# Patient Record
Sex: MALE | Race: WHITE | Employment: OTHER | URBAN - METROPOLITAN AREA
[De-identification: names, ages, dates, MRNs, and addresses within clinical notes are randomized per-mention and may not be internally consistent; named-entity substitution may affect disease eponyms.]

---

## 2021-09-30 ENCOUNTER — HOSPITAL ENCOUNTER (OUTPATIENT)
Dept: PHYSICAL THERAPY | Age: 76
Discharge: HOME | End: 2021-09-30
Payer: MEDICARE

## 2021-09-30 PROCEDURE — 97535 SELF CARE MNGMENT TRAINING: CPT

## 2021-09-30 PROCEDURE — 97166 OT EVAL MOD COMPLEX 45 MIN: CPT

## 2021-09-30 NOTE — THERAPY EVALUATION
Monica Chan  : 1945  [place]  Secondary: 310 [facility] at [address]  [address], [facility], [address]  [phone]   [phone]           OUTPATIENT OCCUPATIONAL THERAPY: Initial Assessment and Daily Note 2021    ICD-10: Treatment Diagnosis: I89.0 lymphedema not elsewhere classified, M79.89 swelling of both LE's. M79.609 pain in limbs  Precautions/Allergies:   Patient has no allergy information on record. Fall Risk Score:    Ambulatory/Rehab Services H2 Model Falls Risk Assessment    Risk Factors:       (1)  Gender [Male] Ability to Rise from Chair:       (1)  Pushes up, successful in one attempt    Falls Prevention Plan: Mobility Assistance Device (specify):  cane   Total: (5 or greater = High Risk): 2     [facility] [address] Patent #[id number]. Federal Law prohibits the replication, distribution or use without written permission from [facility] [employer]     MD Orders: eval and treat MEDICAL/REFERRING DIAGNOSIS:   Lymphedema, not elsewhere classified [I89.0]   DATE OF ONSET: chronic    REFERRING PHYSICIAN: Bernice Ramirez MD  RETURN PHYSICIAN APPOINTMENT: to be determined      INITIAL ASSESSMENT:  Mr. Anupam Delgado was referred to OT for the evaluation and treatment of bilateral LE lymphedema. Swelling has been present for >8 years. He had therapy for lymphedema including MLD and bandaging some years ago at [place] [address]. He responded well to therapy but did not continue to wear compression so LE redness and swelling returned and worsened over the years. He had RTHA in 2021 and is being seen for outpatient PT in [facility]. Swelling did not change following surgery. He reports legs are very heavy and interfere with standing, walking, perform ADL's. He has difficulty donning shoes and socks - has sock aid.  He is a recent  but adult grandson lives with him and helps as needed. Mr Marianela Hernández presents with bilateral LE lymphedema with thick/dense fluid, hyperpigmentation, lipodermatosclerosis, skin dryness, pain. He requires skilled OT for complete decongestive therapy to reduce LE swelling before transitioning to compression garments. He has already ordered compression velcro wraps from [facility] and they will be ready Oct 7th. PLAN OF CARE:   PROBLEM LIST:  1. Increased Pain  2. Decreased Activity Tolerance  3. Edema/Girth  4. Decreased Knowledge of Precautions  5. Decreased Skin Integrity/Hygeine INTERVENTIONS PLANNED  1. Skin care  2. Compression bandaging  3. Fitting for compression garment(s)  4. Manual therapy/Manual lymph drainage  5. Therapeutic exercise/Therapeutic activities  6. Patient Education  7. Compression pump trial prn  8.  kinesiotaping    TREATMENT PLAN:  Effective Dates: 9/30/21 to 12/29/2021 Frequency/Duration: 2x/week up to 90 days  2 xweek x90 days  and upon reassessment. Will adjust frequency and duration as progress indicates. GOALS: (Goals have been discussed and agreed upon with patient.)  Short-Term Functional Goals: Time Frame: 45 days  1. The patient/caregiver will verbalize understanding of lymphedema precautions. 2. Patient will be independent with skin care regimen to decrease risk of cellulitis. 3. The patient/caregiver will be independent with self-manual lymph drainage techniques and show decrease in limb volume. 4. Patient will be independent in lymphatic exercises. Discharge Goals: Time Frame: 90 days  1. Patient's bilateral lower extremity circumferential measurements will decrease on volumetric graph by 5-7cm to maximize functional use in ADL's.    2. The patient/caregiver will be independent with home management of lymphedema. 3. Patient/caregiver will be independent donning and doffing bilateral lower extremity compression garment.     Rehabilitation Potential For Stated Goals: Good  Regarding Jian Fair Jadyn's therapy, I certify that the treatment plan above will be carried out by a therapist or under their direction. Thank you for this referral,  Jhoana Luque, OTR/L, CLT    Referring Physician Signature: Geetha Hope MD _________________________  Date _________            The information in this section was collected on 9/30/2021   (except where otherwise noted). OCCUPATIONAL PROFILE & HISTORY:   History of Present Injury/Illness (Reason for Referral):  Mr. María Elena España was referred to OT for the evaluation and treatment of bilateral LE lymphedema. Swelling has been present for >8 years. He had therapy for lymphedema including MLD and bandaging some years ago at [facility]. He responded well to therapy but did not continue to wear compression so LE redness and swelling returned and worsened over the years. He had RTHA in July 2021 and is being seen for outpatient PT in [facility]. Swelling did not change following surgery. He reports legs are very heavy and interfere with standing, walking, perform ADL's. He has difficulty donning shoes and socks - has sock aid. He is a recent  but adult grandson lives with him and helps as needed. Mr María Elena España presents with bilateral LE lymphedema with thick/dense fluid, hyperpigmentation, lipodermatosclerosis, skin dryness, pain. He requires skilled OT for complete decongestive therapy to reduce LE swelling before transitioning to compression garments. He has already ordered compression velcro wraps from [facility] and they will be ready Oct 7th.    Past Medical History/Comorbidities:   Sleep apnea with CPAP, arthritis, cellulitis of LLE, catarcts, DM, GERD, hiatal hernia, HTN, Lymphedema, prostate cancer, varicella, venous insufficiency, RTHA 7/21  Social History/Living Environment:     pt lives in his own home with adult grandson -   Prior Level of Function/Work/Activity:  Works from home  - sits for extended periods of time at desk - very sedentary since [facility] in July Dominant Side:         RIGHT  Previous Treatment Approaches:          Therapy, compression   Current Medications:  No current outpatient medications on file. Pt will bring list with him to next appt           Date Last Reviewed:  9/30/2021   Complexity Level : Expanded review of therapy/medical records (1-2):  MODERATE COMPLEXITY   ASSESSMENT OF OCCUPATIONAL PERFORMANCE:   Palpation:          Bilateral LE lymphedema : thick, dense fluid from foot to knee   ROM:         PT s/p RTHA  Strength:          Overall decreased - generalized debility   Skin Integrity:          Small skin wound on left anterior tibial area that was weeping but now that it is healing weeping has stopped  - pt demonstrates significant skin changes with hyperpigmentation, skin dryness, lipodermatosclerosis     Sensation:  Intact light touch   Functional Mobility:  Cane   Activities of Daily Living : modified independence - has difficulty donning shoes/socks - has sock aid and is wearing slip on shoes   Edema/Girth:  2+ and pitting   PRETREATMENT AFFECTED LIMB(s): right lower extremity  left lower extremity      Date:  9.30.21         Right / Left           Groin   []      []           8 inches   []      []           4 inches   []      []         PoplitealSpace   [x]      [x] 47/46          8 inches   [x]      [x] 51/52          4 inches   [x]      [x] 41.5/41          Ankle   [x]      [x] 30.5/31          Instep   [x]      [x] 25/26        Measurements are taken in centimeters:  2.54 cm = 1 inch  Total:right 195cm        left 196cm              Physical Skills Involved:  1. Activity Tolerance  2. Edema  3. Skin Integrity  4. pain Cognitive Skills Affected (resulting in the inability to perform in a timely and safe manner):  1. Psychosocial Skills Affected:  1. Habits/Routines   Number of elements that affect the Plan of Care: 3-5:  MODERATE COMPLEXITY   CLINICAL DECISION MAKING:   Outcome Measure: Tool Used:  Tool Used: Lymphedema Life Impact Scale   Score:  Initial: 21 Most Recent: X (Date: -- )   Interpretation of Score: The Lymphedema Life Impact Scale (LLIS) is a validated instrument that measures the physical, functional, and psychosocial concerns pertinent to patients with extremity lymphedema. The Scale's questionnaire is administered to patients to gauge impairments, activity limitations, and participation restrictions resulting from their lymphedema. Score 0 1-13 14-26 27-40 41-54 55-67 68   Modifier CH CI CJ CK CL CM CN     ? Other PT/OT Primary Functional Limitations:     - CURRENT STATUS: CJ - 20%-39% impaired, limited or restricted    - GOAL STATUS: CI - 1%-19% impaired, limited or restricted    - D/C STATUS:  ---------------To be determined---------------       Medical Necessity:   · Skilled intervention continues to be required due to uncontrolled swelling increasing risk of cellulitis and hindering ADL's. Reason for Services/Other Comments:  · Patient continues to require skilled intervention due to inability to self manage lymphedema at this time. Clinical Decision-Making Assessment:     Use of outcome tool(s) and clinical judgement create a POC that gives a: Questionable prediction of patient's progress: MODERATE COMPLEXITY   TREATMENT:   (In addition to Assessment/Re-Assessment sessions the following treatments were rendered)    Pre-treatment Symptoms/Complaints:  Large legs with thick/dense fluid and significant skin changes   Pain: Initial:   Pain Intensity 1: 4  Post Session:  4   Occupational Therapy Treatments:    OT eval(x  ) OT eval was completed 9/30/21. Pt received information on lymphedema and risk reduction/self management practices as outlined by the National Lymphedema Network. Therapeutic Exercise ( minutes):     HEP:  As above; handouts given to patient for all exercises.   Manual Therapy:(minutes)          Manual Lymph Drainage:          Lymph Nodes:    Cervical Supraclavicular Axillary Abdominal Inguinal Popliteal Antecubital   RIGHT []     []     []     []     []     []     []       LEFT []     []     []     []     []     []     []          Anastamoses:   Axillo-axillary Inguino-inguinal Axillo-inguinal Inguino-axillary   ANTERIOR []     []     []     []       POSTERIOR []     []     []     []       RIGHT []     []     []     []       LEFT []     []     []     []         Limbs:   []    RUE     []    LUE     []    RLE    []    LLE   ADL/self care: (30 minutes): Pt was educated on lymph pathology, CDT, skin integrity management, precautions, Exercise/diet/elevation. Treatment was initiated with skin care and application of multi layered compression bandaging using 5\"biagrip stockinet, lymphesoft foam and 2 short stretch bandages per leg. Pt instructed to remove if any medical complications ie SOB, increased pain. Treatment/Session Assessment:    · Response to Treatment:  Pt's goal for therapy is to get swelling under control. He agrees with POC. · Compliance with Program/Exercises: Will assess as treatment progresses. · Recommendations/Intent for next treatment session: \"Next visit will focus on complete decongestive therapy - reduction phase\".   Total Treatment Duration:60min  OT Patient Time In/Time Out  Time In: 0900  Time Out: [address], OTR/L

## 2021-10-05 ENCOUNTER — HOSPITAL ENCOUNTER (OUTPATIENT)
Dept: PHYSICAL THERAPY | Age: 76
Discharge: HOME | End: 2021-10-05
Payer: MEDICARE

## 2021-10-05 PROCEDURE — 97140 MANUAL THERAPY 1/> REGIONS: CPT

## 2021-10-05 PROCEDURE — 97535 SELF CARE MNGMENT TRAINING: CPT

## 2021-10-05 NOTE — PROGRESS NOTES
Johanna Regan  : 1945  [place]  Secondary: 310 [facility] at [address]  [address], [address]  [phone]   [phone]           OUTPATIENT OCCUPATIONAL THERAPY: Daily Note 10/5/2021    ICD-10: Treatment Diagnosis: I89.0 lymphedema not elsewhere classified, M79.89 swelling of both LE's. M79.609 pain in limbs  Precautions/Allergies:   Patient has no allergy information on record. Fall Risk Score:    Ambulatory/Rehab Services H2 Model Falls Risk Assessment    Risk Factors:       (1)  Gender [Male] Ability to Rise from Chair:       (1)  Pushes up, successful in one attempt    Falls Prevention Plan: Mobility Assistance Device (specify):  cane   Total: (5 or greater = High Risk): 2     [facility] Patent #[id number]. Federal Law prohibits the replication, distribution or use without written permission from [facility] [employer]     MD Orders: eval and treat MEDICAL/REFERRING DIAGNOSIS:   Lymphedema, not elsewhere classified [I89.0]   DATE OF ONSET: chronic    REFERRING PHYSICIAN: Ricardo Linares MD  RETURN PHYSICIAN APPOINTMENT: to be determined      INITIAL ASSESSMENT:  Mr. Alan Leija was referred to OT for the evaluation and treatment of bilateral LE lymphedema. Swelling has been present for >8 years. He had therapy for lymphedema including MLD and bandaging some years ago at [employer]. He responded well to therapy but did not continue to wear compression so LE redness and swelling returned and worsened over the years. He had RTHA in 2021 and is being seen for outpatient PT in [facility]. Swelling did not change following surgery. He reports legs are very heavy and interfere with standing, walking, perform ADL's. He has difficulty donning shoes and socks - has sock aid.  He is a recent  but adult grandson lives with him and helps as needed. Mr Marianela Hernández presents with bilateral LE lymphedema with thick/dense fluid, hyperpigmentation, lipodermatosclerosis, skin dryness, pain. He requires skilled OT for complete decongestive therapy to reduce LE swelling before transitioning to compression garments. He has already ordered compression velcro wraps from [facility] and they will be ready Oct 7th. PLAN OF CARE:   PROBLEM LIST:  1. Increased Pain  2. Decreased Activity Tolerance  3. Edema/Girth  4. Decreased Knowledge of Precautions  5. Decreased Skin Integrity/Hygeine INTERVENTIONS PLANNED  1. Skin care  2. Compression bandaging  3. Fitting for compression garment(s)  4. Manual therapy/Manual lymph drainage  5. Therapeutic exercise/Therapeutic activities  6. Patient Education  7. Compression pump trial prn  8.  kinesiotaping    TREATMENT PLAN:  Effective Dates: 9/30/21 to 12/29/2021 Frequency/Duration: 2x/week up to 90 days  2 xweek x90 days  and upon reassessment. Will adjust frequency and duration as progress indicates. GOALS: (Goals have been discussed and agreed upon with patient.)  Short-Term Functional Goals: Time Frame: 45 days  1. The patient/caregiver will verbalize understanding of lymphedema precautions. 2. Patient will be independent with skin care regimen to decrease risk of cellulitis. 3. The patient/caregiver will be independent with self-manual lymph drainage techniques and show decrease in limb volume. 4. Patient will be independent in lymphatic exercises. Discharge Goals: Time Frame: 90 days  1. Patient's bilateral lower extremity circumferential measurements will decrease on volumetric graph by 5-7cm to maximize functional use in ADL's.    2. The patient/caregiver will be independent with home management of lymphedema. 3. Patient/caregiver will be independent donning and doffing bilateral lower extremity compression garment.     Rehabilitation Potential For Stated Goals: Good              The information in this section was collected on 10/5/2021   (except where otherwise noted). OCCUPATIONAL PROFILE & HISTORY:   History of Present Injury/Illness (Reason for Referral):  Mr. Aneudy Hicks was referred to OT for the evaluation and treatment of bilateral LE lymphedema. Swelling has been present for >8 years. He had therapy for lymphedema including MLD and bandaging some years ago at [email or web address]. He responded well to therapy but did not continue to wear compression so LE redness and swelling returned and worsened over the years. He had RTHA in July 2021 and is being seen for outpatient PT in [facility]. Swelling did not change following surgery. He reports legs are very heavy and interfere with standing, walking, perform ADL's. He has difficulty donning shoes and socks - has sock aid. He is a recent  but adult grandson lives with him and helps as needed. Mr Aneudy Hicks presents with bilateral LE lymphedema with thick/dense fluid, hyperpigmentation, lipodermatosclerosis, skin dryness, pain. He requires skilled OT for complete decongestive therapy to reduce LE swelling before transitioning to compression garments. He has already ordered compression velcro wraps from [facility] and they will be ready Oct 7th. Past Medical History/Comorbidities:   Sleep apnea with CPAP, arthritis, cellulitis of LLE, catarcts, DM, GERD, hiatal hernia, HTN, Lymphedema, prostate cancer, varicella, venous insufficiency, RTHA 7/21  Social History/Living Environment:     pt lives in his own home with adult grandson -   Prior Level of Function/Work/Activity:  Works from home  - sits for extended periods of time at desk - very sedentary since [address] in July   Dominant Side:         RIGHT  Previous Treatment Approaches:          Therapy, compression   Current Medications:  No current outpatient medications on file.  Pt will bring list with him to next appt           Date Last Reviewed:  10/5/2021   Complexity Level : Expanded review of therapy/medical records (1-2): MODERATE COMPLEXITY   ASSESSMENT OF OCCUPATIONAL PERFORMANCE:   Palpation:          Bilateral LE lymphedema : thick, dense fluid from foot to knee   ROM:         PT s/p RTHA  Strength:          Overall decreased - generalized debility   Skin Integrity:          Small skin wound on left anterior tibial area that was weeping but now that it is healing weeping has stopped  - pt demonstrates significant skin changes with hyperpigmentation, skin dryness, lipodermatosclerosis     Sensation:  Intact light touch   Functional Mobility:  Cane   Activities of Daily Living : modified independence - has difficulty donning shoes/socks - has sock aid and is wearing slip on shoes   Edema/Girth:  2+ and pitting   PRETREATMENT AFFECTED LIMB(s): right lower extremity  left lower extremity      Date:  9.30.21         Right / Left           Groin   []      []           8 inches   []      []           4 inches   []      []         PoplitealSpace   [x]      [x] 47/46          8 inches   [x]      [x] 51/52          4 inches   [x]      [x] 41.5/41          Ankle   [x]      [x] 30.5/31          Instep   [x]      [x] 25/26        Measurements are taken in centimeters:  2.54 cm = 1 inch  Total:right 195cm        left 196cm              Physical Skills Involved:  1. Activity Tolerance  2. Edema  3. Skin Integrity  4. pain Cognitive Skills Affected (resulting in the inability to perform in a timely and safe manner):  1. Psychosocial Skills Affected:  1. Habits/Routines   Number of elements that affect the Plan of Care: 3-5:  MODERATE COMPLEXITY   CLINICAL DECISION MAKING:   Outcome Measure: Tool Used: Tool Used: Lymphedema Life Impact Scale   Score:  Initial: 21 Most Recent: X (Date: -- )   Interpretation of Score: The Lymphedema Life Impact Scale (LLIS) is a validated instrument that measures the physical, functional, and psychosocial concerns pertinent to patients with extremity lymphedema.   The Scale's questionnaire is administered to patients to gauge impairments, activity limitations, and participation restrictions resulting from their lymphedema. Score 0 1-13 14-26 27-40 41-54 55-67 68   Modifier CH CI CJ CK CL CM CN     ? Other PT/OT Primary Functional Limitations:     - CURRENT STATUS: CJ - 20%-39% impaired, limited or restricted    - GOAL STATUS: CI - 1%-19% impaired, limited or restricted    - D/C STATUS:  ---------------To be determined---------------       Medical Necessity:   · Skilled intervention continues to be required due to uncontrolled swelling increasing risk of cellulitis and hindering ADL's. Reason for Services/Other Comments:  · Patient continues to require skilled intervention due to inability to self manage lymphedema at this time. Clinical Decision-Making Assessment:     Use of outcome tool(s) and clinical judgement create a POC that gives a: Questionable prediction of patient's progress: MODERATE COMPLEXITY   TREATMENT:   (In addition to Assessment/Re-Assessment sessions the following treatments were rendered)    Pre-treatment Symptoms/Complaints: Pt reports he wore bandages for 2 days, then, removed to take a shower. Pain: Initial:   Pain Intensity 1: 4  Post Session:  4   Occupational Therapy Treatments:    Therapeutic Exercise ( minutes):     HEP:  As above; handouts given to patient for all exercises. Manual Therapy:(45 minutes)  Manual lymphatic drainage B LEs and trunk.   Skin care with Eucerin          Manual Lymph Drainage:          Lymph Nodes:    Cervical Supraclavicular Axillary Abdominal Inguinal Popliteal Antecubital   RIGHT X     X    X     -     X     X     -       LEFT X     X     X     -     X    X     -          Anastamoses:   Axillo-axillary Inguino-inguinal Axillo-inguinal Inguino-axillary   ANTERIOR -     -     -     X       POSTERIOR -     -     -     -       RIGHT -     -     -     X       LEFT -     -     -     X         Limbs:   -    RUE     -    LUE     X    RLE    X LLE   ADL/self care: (15 minutes): Following skin care, application of multi layered compression bandaging using 4\" stockinet, grey foam and 3 short stretch bandages per leg. Pt instructed to remove if any medical complications ie SOB, increased pain. Treatment/Session Assessment:    · Response to Treatment:  Tolerated treatment without complication. · Compliance with Program/Exercises:  Compliant  · Recommendations/Intent for next treatment session: \"Next visit will focus on complete decongestive therapy - reduction phase\".   Total Treatment Duration:60min  OT Patient Time In/Time Out  Time In: 0800  Time Out: 0900    Ning Carl OTR/LIA

## 2021-10-07 ENCOUNTER — HOSPITAL ENCOUNTER (OUTPATIENT)
Dept: PHYSICAL THERAPY | Age: 76
Discharge: HOME | End: 2021-10-07
Payer: MEDICARE

## 2021-10-07 PROCEDURE — 97535 SELF CARE MNGMENT TRAINING: CPT

## 2021-10-07 PROCEDURE — 97140 MANUAL THERAPY 1/> REGIONS: CPT

## 2021-10-07 NOTE — PROGRESS NOTES
Aneudy Brown  : 1945  [place]  Secondary: [address] [facility] at [facility]  [address], [facility], [address]  [phone]   [phone]           OUTPATIENT OCCUPATIONAL THERAPY: Daily Note 10/7/2021    ICD-10: Treatment Diagnosis: I89.0 lymphedema not elsewhere classified, M79.89 swelling of both LE's. M79.609 pain in limbs  Precautions/Allergies:   Patient has no allergy information on record. Fall Risk Score:    Ambulatory/Rehab Services H2 Model Falls Risk Assessment    Risk Factors:       (1)  Gender [Male] Ability to Rise from Chair:       (1)  Pushes up, successful in one attempt    Falls Prevention Plan: Mobility Assistance Device (specify):  cane   Total: (5 or greater = High Risk): 2     [facility] [place] Patent #[id number]. Federal Law prohibits the replication, distribution or use without written permission from [facility] [employer]     MD Orders: eval and treat MEDICAL/REFERRING DIAGNOSIS:   Lymphedema, not elsewhere classified [I89.0]   DATE OF ONSET: chronic    REFERRING PHYSICIAN: Beto Sutton MD  RETURN PHYSICIAN APPOINTMENT: to be determined      INITIAL ASSESSMENT:  Mr. Daniel Sapp was referred to OT for the evaluation and treatment of bilateral LE lymphedema. Swelling has been present for >8 years. He had therapy for lymphedema including MLD and bandaging some years ago at [facility]. He responded well to therapy but did not continue to wear compression so LE redness and swelling returned and worsened over the years. He had RTHA in 2021 and is being seen for outpatient PT in [facility] [place]. Swelling did not change following surgery. He reports legs are very heavy and interfere with standing, walking, perform ADL's. He has difficulty donning shoes and socks - has sock aid.  He is a recent  but adult grandson lives with him and helps as needed. Mr Briseida Mayo presents with bilateral LE lymphedema with thick/dense fluid, hyperpigmentation, lipodermatosclerosis, skin dryness, pain. He requires skilled OT for complete decongestive therapy to reduce LE swelling before transitioning to compression garments. He has already [place] compression velcro [place] [facility] and they will be ready Oct 7th. PLAN OF CARE:   PROBLEM LIST:  1. Increased Pain  2. Decreased Activity Tolerance  3. Edema/Girth  4. Decreased Knowledge of Precautions  5. Decreased Skin Integrity/Hygeine INTERVENTIONS PLANNED  1. Skin care  2. Compression bandaging  3. Fitting for compression garment(s)  4. Manual therapy/Manual lymph drainage  5. Therapeutic exercise/Therapeutic activities  6. Patient Education  7. Compression pump trial prn  8.  kinesiotaping    TREATMENT PLAN:  Effective Dates: 9/30/21 to 12/29/2021 Frequency/Duration: 2x/week up to 90 days  2 xweek x90 days  and upon reassessment. Will adjust frequency and duration as progress indicates. GOALS: (Goals have been discussed and agreed upon with patient.)  Short-Term Functional Goals: Time Frame: 45 days  1. The patient/caregiver will verbalize understanding of lymphedema precautions. 2. Patient will be independent with skin care regimen to decrease risk of cellulitis. 3. The patient/caregiver will be independent with self-manual lymph drainage techniques and show decrease in limb volume. 4. Patient will be independent in lymphatic exercises. Discharge Goals: Time Frame: 90 days  1. Patient's bilateral lower extremity circumferential measurements will decrease on volumetric graph by 5-7cm to maximize functional use in ADL's.    2. The patient/caregiver will be independent with home management of lymphedema. 3. Patient/caregiver will be independent donning and doffing bilateral lower extremity compression garment.     Rehabilitation Potential For Stated Goals: Good              The information in this section was collected on 10/7/2021   (except where otherwise noted). OCCUPATIONAL PROFILE & HISTORY:   History of Present Injury/Illness (Reason for Referral):  Mr. Alan Leija was referred to OT for the evaluation and treatment of bilateral LE lymphedema. Swelling has been present for >8 years. He had therapy for lymphedema including MLD and bandaging some years ago at [facility]. He responded well to therapy but did not continue to wear compression so LE redness and swelling returned and worsened over the years. He had RTHA in July 2021 and is being seen for outpatient PT in [facility]. Swelling did not change following surgery. He reports legs are very heavy and interfere with standing, walking, perform ADL's. He has difficulty donning shoes and socks - has sock aid. He is a recent  but adult grandson lives with him and helps as needed. Mr Alan Leija presents with bilateral LE lymphedema with thick/dense fluid, hyperpigmentation, lipodermatosclerosis, skin dryness, pain. He requires skilled OT for complete decongestive therapy to reduce LE swelling before transitioning to compression garments. He has already ordered compression velcro wraps [facility] and they will be ready Oct 7th. Past Medical History/Comorbidities:   Sleep apnea with CPAP, arthritis, cellulitis of LLE, catarcts, DM, GERD, hiatal hernia, HTN, Lymphedema, prostate cancer, varicella, venous insufficiency, RTHA 7/21  Social History/Living Environment:     pt lives in his own home with adult grandson -   Prior Level of Function/Work/Activity:  Works from home  - sits for extended periods of time at desk - very sedentary since [address] in July   Dominant Side:         RIGHT  Previous Treatment Approaches:          Therapy, compression   Current Medications:  No current outpatient medications on file.  Pt will bring list with him to next appt           Date Last Reviewed:  10/7/2021   Complexity Level : Expanded review of therapy/medical records (1-2): MODERATE COMPLEXITY   ASSESSMENT OF OCCUPATIONAL PERFORMANCE:   Palpation:          Bilateral LE lymphedema : thick, dense fluid from foot to knee   ROM:         PT s/p RTHA  Strength:          Overall decreased - generalized debility   Skin Integrity:          Small skin wound on left anterior tibial area that was weeping but now that it is healing weeping has stopped  - pt demonstrates significant skin changes with hyperpigmentation, skin dryness, lipodermatosclerosis     Sensation:  Intact light touch   Functional Mobility:  Cane   Activities of Daily Living : modified independence - has difficulty donning shoes/socks - has sock aid and is wearing slip on shoes   Edema/Girth:  2+ and pitting   PRETREATMENT AFFECTED LIMB(s): right lower extremity  left lower extremity      Date:  9.30.21         Right / Left           Groin   []      []           8 inches   []      []           4 inches   []      []         PoplitealSpace   [x]      [x] 47/46          8 inches   [x]      [x] 51/52          4 inches   [x]      [x] 41.5/41          Ankle   [x]      [x] 30.5/31          Instep   [x]      [x] 25/26        Measurements are taken in centimeters:  2.54 cm = 1 inch  Total:right 195cm        left 196cm              Physical Skills Involved:  1. Activity Tolerance  2. Edema  3. Skin Integrity  4. pain Cognitive Skills Affected (resulting in the inability to perform in a timely and safe manner):  1. Psychosocial Skills Affected:  1. Habits/Routines   Number of elements that affect the Plan of Care: 3-5:  MODERATE COMPLEXITY   CLINICAL DECISION MAKING:   Outcome Measure: Tool Used: Tool Used: Lymphedema Life Impact Scale   Score:  Initial: 21 Most Recent: X (Date: -- )   Interpretation of Score: The Lymphedema Life Impact Scale (LLIS) is a validated instrument that measures the physical, functional, and psychosocial concerns pertinent to patients with extremity lymphedema.   The Scale's questionnaire is administered to patients to gauge impairments, activity limitations, and participation restrictions resulting from their lymphedema. Score 0 1-13 14-26 27-40 41-54 55-67 68   Modifier CH CI CJ CK CL CM CN     ? Other PT/OT Primary Functional Limitations:     - CURRENT STATUS: CJ - 20%-39% impaired, limited or restricted    - GOAL STATUS: CI - 1%-19% impaired, limited or restricted    - D/C STATUS:  ---------------To be determined---------------       Medical Necessity:   · Skilled intervention continues to be required due to uncontrolled swelling increasing risk of cellulitis and hindering ADL's. Reason for Services/Other Comments:  · Patient continues to require skilled intervention due to inability to self manage lymphedema at this time. Clinical Decision-Making Assessment:     Use of outcome tool(s) and clinical judgement create a POC that gives a: Questionable prediction of patient's progress: MODERATE COMPLEXITY   TREATMENT:   (In addition to Assessment/Re-Assessment sessions the following treatments were rendered)    Pre-treatment Symptoms/Complaints: Tolerating bandages well. Removed this morning to shower. Pain: Initial:   Pain Intensity 1: 4  Post Session:  4   Occupational Therapy Treatments:    Therapeutic Exercise ( minutes):     HEP:  As above; handouts given to patient for all exercises. Manual Therapy:(45 minutes)  Manual lymphatic drainage B LEs and trunk. Skin care with Eucerin followed by compression bandaging.            Manual Lymph Drainage:          Lymph Nodes:    Cervical Supraclavicular Axillary Abdominal Inguinal Popliteal Antecubital   RIGHT X     X    X     -     X     X     -       LEFT X     X     X     -     X    X     -          Anastamoses:   Axillo-axillary Inguino-inguinal Axillo-inguinal Inguino-axillary   ANTERIOR -     -     -     X       POSTERIOR -     -     -     -       RIGHT -     -     -     X       LEFT -     -     -     X         Limbs:   -    RUE     -    LUE X    RLE    X    LLE   ADL/self care: (15 minutes): Following skin care, application of multi layered compression bandaging using 4\" stockinet, grey foam and 3 short stretch bandages per leg. Pt instructed to remove if any medical complications ie SOB, increased pain. Treatment/Session Assessment:    · Response to Treatment:  Tolerated treatment without complication. Instructed on LE exercises to perform while wearing bandages - will perform daily. · Compliance with Program/Exercises:  Compliant  · Recommendations/Intent for next treatment session: \"Next visit will focus on complete decongestive therapy - reduction phase\".   Total Treatment Duration:60min  OT Patient Time In/Time Out  Time In: 0900  Time Out: 0421 [facility],[address], OTR/L

## 2021-10-12 ENCOUNTER — HOSPITAL ENCOUNTER (OUTPATIENT)
Dept: PHYSICAL THERAPY | Age: 76
Discharge: HOME | End: 2021-10-12
Payer: MEDICARE

## 2021-10-12 PROCEDURE — 97140 MANUAL THERAPY 1/> REGIONS: CPT

## 2021-10-12 PROCEDURE — 97535 SELF CARE MNGMENT TRAINING: CPT

## 2021-10-12 NOTE — PROGRESS NOTES
Germán Wyman  : 1945  [place]  Secondary: [address] [facility] at [address]  [address], [address]  [phone]   [phone]           OUTPATIENT OCCUPATIONAL THERAPY: Daily Note 10/12/2021    ICD-10: Treatment Diagnosis: I89.0 lymphedema not elsewhere classified, M79.89 swelling of both LE's. M79.609 pain in limbs  Precautions/Allergies:   Patient has no allergy information on record. Fall Risk Score:    Ambulatory/Rehab Services H2 Model Falls Risk Assessment    Risk Factors:       (1)  Gender [Male] Ability to Rise from Chair:       (1)  Pushes up, successful in one attempt    Falls Prevention Plan: Mobility Assistance Device (specify):  cane   Total: (5 or greater = High Risk): 2     [facility]. [facility] Patent #[id number]. Federal Law prohibits the replication, distribution or use without written permission from [facility] [employer]     MD Orders: eval and treat MEDICAL/REFERRING DIAGNOSIS:   Lymphedema, not elsewhere classified [I89.0]   DATE OF ONSET: chronic    REFERRING PHYSICIAN: Anabell Bernal MD  RETURN PHYSICIAN APPOINTMENT: to be determined      INITIAL ASSESSMENT:  Mr. Abran Jamison was referred to OT for the evaluation and treatment of bilateral LE lymphedema. Swelling has been present for >8 years. He had therapy for lymphedema including MLD and bandaging some years ago at [facility]. He responded well to therapy but did not continue to wear compression so LE redness and swelling returned and worsened over the years. He had RTHA in 2021 and is being seen for outpatient PT in [facility]. Swelling did not change following surgery. He reports legs are very heavy and interfere with standing, walking, perform ADL's. He has difficulty donning shoes and socks - has sock aid.  He is a recent  but adult grandson lives with him and helps as needed. Mr Abran Jamison presents with bilateral LE lymphedema with thick/dense fluid, hyperpigmentation, lipodermatosclerosis, skin dryness, pain. He requires skilled OT for complete decongestive therapy to reduce LE swelling before transitioning to compression garments. He has already [place] compression [place] [facility] and [place] be ready Oct 7th. PLAN OF CARE:   PROBLEM LIST:  1. Increased Pain  2. Decreased Activity Tolerance  3. Edema/Girth  4. Decreased Knowledge of Precautions  5. Decreased Skin Integrity/Hygeine INTERVENTIONS PLANNED  1. Skin care  2. Compression bandaging  3. Fitting for compression garment(s)  4. Manual therapy/Manual lymph drainage  5. Therapeutic exercise/Therapeutic activities  6. Patient Education  7. Compression pump trial prn  8.  kinesiotaping    TREATMENT PLAN:  Effective Dates: 9/30/21 to 12/29/2021 Frequency/Duration: 2x/week up to 90 days  2 xweek x90 days  and upon reassessment. Will adjust frequency and duration as progress indicates. GOALS: (Goals have been discussed and agreed upon with patient.)  Short-Term Functional Goals: Time Frame: 45 days  1. The patient/caregiver will verbalize understanding of lymphedema precautions. 2. Patient will be independent with skin care regimen to decrease risk of cellulitis. 3. The patient/caregiver will be independent with self-manual lymph drainage techniques and show decrease in limb volume. 4. Patient will be independent in lymphatic exercises. Discharge Goals: Time Frame: 90 days  1. Patient's bilateral lower extremity circumferential measurements will decrease on volumetric graph by 5-7cm to maximize functional use in ADL's.    2. The patient/caregiver will be independent with home management of lymphedema. 3. Patient/caregiver will be independent donning and doffing bilateral lower extremity compression garment.     Rehabilitation Potential For Stated Goals: Good              The information in this section was collected on 10/12/2021   (except where otherwise noted). OCCUPATIONAL PROFILE & HISTORY:   History of Present Injury/Illness (Reason for Referral):  Mr. Aneudy Hicks was referred to OT for the evaluation and treatment of bilateral LE lymphedema. Swelling has been present for >8 years. He had therapy for lymphedema including MLD and bandaging some years ago at [facility]. He responded well to therapy but did not continue to wear compression so LE redness and swelling returned and worsened over the years. He had RTHA in July 2021 and is being seen for outpatient PT in [facility]. Swelling did not change following surgery. He reports legs are very heavy and interfere with standing, walking, perform ADL's. He has difficulty donning shoes and socks - has sock aid. He is a recent  but adult grandson lives with him and helps as needed. Mr Aneudy Hicks presents with bilateral LE lymphedema with thick/dense fluid, hyperpigmentation, lipodermatosclerosis, skin dryness, pain. He requires skilled OT for complete decongestive therapy to reduce LE swelling before transitioning to compression garments. He has already ordered compression velcro wraps from [facility] and they will be ready Oct 7th. Past Medical History/Comorbidities:   Sleep apnea with CPAP, arthritis, cellulitis of LLE, catarcts, DM, GERD, hiatal hernia, HTN, Lymphedema, prostate cancer, varicella, venous insufficiency, RTHA 7/21  Social History/Living Environment:     pt lives in his own home with adult grandson -   Prior Level of Function/Work/Activity:  Works from home  - sits for extended periods of time at desk - very sedentary since [address] in July   Dominant Side:         RIGHT  Previous Treatment Approaches:          Therapy, compression   Current Medications:  No current outpatient medications on file.  Pt will bring list with him to next appt           Date Last Reviewed:  10/12/2021   Complexity Level : Expanded review of therapy/medical records (1-2): MODERATE COMPLEXITY   ASSESSMENT OF OCCUPATIONAL PERFORMANCE:   Palpation:          Bilateral LE lymphedema : thick, dense fluid from foot to knee   ROM:         PT s/p RTHA  Strength:          Overall decreased - generalized debility   Skin Integrity:          Small skin wound on left anterior tibial area that was weeping but now that it is healing weeping has stopped  - pt demonstrates significant skin changes with hyperpigmentation, skin dryness, lipodermatosclerosis     Sensation:  Intact light touch   Functional Mobility:  Cane   Activities of Daily Living : modified independence - has difficulty donning shoes/socks - has sock aid and is wearing slip on shoes   Edema/Girth:  2+ and pitting   PRETREATMENT AFFECTED LIMB(s): right lower extremity  left lower extremity      Date:  9.30.21         Right / Left           Groin   []      []           8 inches   []      []           4 inches   []      []         PoplitealSpace   [x]      [x] 47/46          8 inches   [x]      [x] 51/52          4 inches   [x]      [x] 41.5/41          Ankle   [x]      [x] 30.5/31          Instep   [x]      [x] 25/26        Measurements are taken in centimeters:  2.54 cm = 1 inch  Total:right 195cm        left 196cm              Physical Skills Involved:  1. Activity Tolerance  2. Edema  3. Skin Integrity  4. pain Cognitive Skills Affected (resulting in the inability to perform in a timely and safe manner):  1. Psychosocial Skills Affected:  1. Habits/Routines   Number of elements that affect the Plan of Care: 3-5:  MODERATE COMPLEXITY   CLINICAL DECISION MAKING:   Outcome Measure: Tool Used: Tool Used: Lymphedema Life Impact Scale   Score:  Initial: 21 Most Recent: X (Date: -- )   Interpretation of Score: The Lymphedema Life Impact Scale (LLIS) is a validated instrument that measures the physical, functional, and psychosocial concerns pertinent to patients with extremity lymphedema.   The Scale's questionnaire is administered to patients to gauge impairments, activity limitations, and participation restrictions resulting from their lymphedema. Score 0 1-13 14-26 27-40 41-54 55-67 68   Modifier CH CI CJ CK CL CM CN     ? Other PT/OT Primary Functional Limitations:     - CURRENT STATUS: CJ - 20%-39% impaired, limited or restricted    - GOAL STATUS: CI - 1%-19% impaired, limited or restricted    - D/C STATUS:  ---------------To be determined---------------       Medical Necessity:   · Skilled intervention continues to be required due to uncontrolled swelling increasing risk of cellulitis and hindering ADL's. Reason for Services/Other Comments:  · Patient continues to require skilled intervention due to inability to self manage lymphedema at this time. Clinical Decision-Making Assessment:     Use of outcome tool(s) and clinical judgement create a POC that gives a: Questionable prediction of patient's progress: MODERATE COMPLEXITY   TREATMENT:   (In addition to Assessment/Re-Assessment sessions the following treatments were rendered)    Pre-treatment Symptoms/Complaints: Wore bandages for 48 hours then removed to shower. Is getting velcro wraps today. Pain: Initial:   Pain Intensity 1: 4  Post Session:  4   Occupational Therapy Treatments:    Therapeutic Exercise ( minutes):     HEP:  As above; handouts given to patient for all exercises. Manual Therapy:(45 minutes)  Manual lymphatic drainage B LEs and trunk. Skin care with Eucerin followed by compression bandaging.            Manual Lymph Drainage:          Lymph Nodes:    Cervical Supraclavicular Axillary Abdominal Inguinal Popliteal Antecubital   RIGHT X     X    X     -     X     X     -       LEFT X     X     X     -     X    X     -          Anastamoses:   Axillo-axillary Inguino-inguinal Axillo-inguinal Inguino-axillary   ANTERIOR -     -     -     X       POSTERIOR -     -     -     -       RIGHT -     -     -     X       LEFT -     -     -     X         Limbs: -    RUE     -    LUE     X    RLE    X    LLE   ADL/self care: (15 minutes): Following skin care, application of multi layered compression bandaging using 4\" stockinet, grey foam and 3 short stretch bandages per leg. Pt instructed to remove if any medical complications ie SOB, increased pain. Will look into purchasing cast boots for showering. Discussed consistent 24/7 compression management in the reduction phase yields best results. Pt verbalized understanding. Treatment/Session Assessment:    · Response to Treatment:  Tolerated treatment without complication. · Compliance with Program/Exercises:  Compliant  · Recommendations/Intent for next treatment session: \"Next visit will focus on complete decongestive therapy - reduction phase\".   Total Treatment Duration:60min  OT Patient Time In/Time Out  Time In: 0800  Time Out: 0900    DEAN Ennis/LIA

## 2021-10-14 ENCOUNTER — HOSPITAL ENCOUNTER (OUTPATIENT)
Dept: PHYSICAL THERAPY | Age: 76
Discharge: HOME | End: 2021-10-14
Payer: MEDICARE

## 2021-10-14 PROCEDURE — 97140 MANUAL THERAPY 1/> REGIONS: CPT

## 2021-10-14 PROCEDURE — 97535 SELF CARE MNGMENT TRAINING: CPT

## 2021-10-14 NOTE — PROGRESS NOTES
Kraig Lino  : 1945  [place]  Secondary: 310 [facility] at [address]  [address], [facility], [address]  [phone]   [phone]           OUTPATIENT OCCUPATIONAL THERAPY: Daily Note 10/14/2021    ICD-10: Treatment Diagnosis: I89.0 lymphedema not elsewhere classified, M79.89 swelling of both LE's. M79.609 pain in limbs  Precautions/Allergies:   Patient has no allergy information on record. Fall Risk Score:    Ambulatory/Rehab Services H2 Model Falls Risk Assessment    Risk Factors:       (1)  Gender [Male] Ability to Rise from Chair:       (1)  Pushes up, successful in one attempt    Falls Prevention Plan: Mobility Assistance Device (specify):  cane   Total: (5 or greater = High Risk): 2     [facility]. [facility] Patent #[id number]. Federal Law prohibits the replication, distribution or use without written permission from [facility] [employer]     MD Orders: eval and treat MEDICAL/REFERRING DIAGNOSIS:   Lymphedema, not elsewhere classified [I89.0]   DATE OF ONSET: chronic    REFERRING PHYSICIAN: Larisa Ortega MD  RETURN PHYSICIAN APPOINTMENT: to be determined      INITIAL ASSESSMENT:  Mr. Apple Weaver was referred to OT for the evaluation and treatment of bilateral LE lymphedema. Swelling has been present for >8 years. He had therapy for lymphedema including MLD and bandaging some years ago at [facility]. He responded well to therapy but did not continue to wear compression so LE redness and swelling returned and worsened over the years. He had RTHA in 2021 and is being seen for outpatient PT in [facility]. Swelling did not change following surgery. He reports legs are very heavy and interfere with standing, walking, perform ADL's. He has difficulty donning shoes and socks - has sock aid.  He is a recent  but adult grandson lives with him and helps as needed. Mr María Elena España presents with bilateral LE lymphedema with thick/dense fluid, hyperpigmentation, lipodermatosclerosis, skin dryness, pain. He requires skilled OT for complete decongestive therapy to reduce LE swelling before transitioning to compression garments. He has already ordered compression velcro wraps from [facility] and they will be ready Oct 7th. PLAN OF CARE:   PROBLEM LIST:  1. Increased Pain  2. Decreased Activity Tolerance  3. Edema/Girth  4. Decreased Knowledge of Precautions  5. Decreased Skin Integrity/Hygeine INTERVENTIONS PLANNED  1. Skin care  2. Compression bandaging  3. Fitting for compression garment(s)  4. Manual therapy/Manual lymph drainage  5. Therapeutic exercise/Therapeutic activities  6. Patient Education  7. Compression pump trial prn  8.  kinesiotaping    TREATMENT PLAN:  Effective Dates: 9/30/21 to 12/29/2021 Frequency/Duration: 2x/week up to 90 days  2 xweek x90 days  and upon reassessment. Will adjust frequency and duration as progress indicates. GOALS: (Goals have been discussed and agreed upon with patient.)  Short-Term Functional Goals: Time Frame: 45 days  1. The patient/caregiver will verbalize understanding of lymphedema precautions. 2. Patient will be independent with skin care regimen to decrease risk of cellulitis. 3. The patient/caregiver will be independent with self-manual lymph drainage techniques and show decrease in limb volume. 4. Patient will be independent in lymphatic exercises. Discharge Goals: Time Frame: 90 days  1. Patient's bilateral lower extremity circumferential measurements will decrease on volumetric graph by 5-7cm to maximize functional use in ADL's.    2. The patient/caregiver will be independent with home management of lymphedema. 3. Patient/caregiver will be independent donning and doffing bilateral lower extremity compression garment.     Rehabilitation Potential For Stated Goals: Good              The information in this section was collected on 10/14/2021   (except where otherwise noted). OCCUPATIONAL PROFILE & HISTORY:   History of Present Injury/Illness (Reason for Referral):  Mr. Radha Cuevas was referred to OT for the evaluation and treatment of bilateral LE lymphedema. Swelling has been present for >8 years. He had therapy for lymphedema including MLD and bandaging some years ago at [facility]. He responded well to therapy but did not continue to wear compression so LE redness and swelling returned and worsened over the years. He had RTHA in July 2021 and is being seen for outpatient PT in [facility]. Swelling did not change following surgery. He reports legs are very heavy and interfere with standing, walking, perform ADL's. He has difficulty donning shoes and socks - has sock aid. He is a recent  but adult grandson lives with him and helps as needed. Mr Radha Cuevas presents with bilateral LE lymphedema with thick/dense fluid, hyperpigmentation, lipodermatosclerosis, skin dryness, pain. He requires skilled OT for complete decongestive therapy to reduce LE swelling before transitioning to compression garments. He has already ordered compression velcro wraps from [facility] and they will be ready Oct 7th. Past Medical History/Comorbidities:   Sleep apnea with CPAP, arthritis, cellulitis of LLE, catarcts, DM, GERD, hiatal hernia, HTN, Lymphedema, prostate cancer, varicella, venous insufficiency, RTHA 7/21  Social History/Living Environment:     pt lives in his own home with adult grandson -   Prior Level of Function/Work/Activity:  Works from home  - sits for extended periods of time at desk - very sedentary since [address] in July   Dominant Side:         RIGHT  Previous Treatment Approaches:          Therapy, compression   Current Medications:  No current outpatient medications on file.  Pt will bring list with him to next appt           Date Last Reviewed:  10/14/2021   Complexity Level : Expanded review of therapy/medical records (1-2): MODERATE COMPLEXITY   ASSESSMENT OF OCCUPATIONAL PERFORMANCE:   Palpation:          Bilateral LE lymphedema : thick, dense fluid from foot to knee   ROM:         PT s/p RTHA  Strength:          Overall decreased - generalized debility   Skin Integrity:          Small skin wound on left anterior tibial area that was weeping but now that it is healing weeping has stopped  - pt demonstrates significant skin changes with hyperpigmentation, skin dryness, lipodermatosclerosis     Sensation:  Intact light touch   Functional Mobility:  Cane   Activities of Daily Living : modified independence - has difficulty donning shoes/socks - has sock aid and is wearing slip on shoes   Edema/Girth:  2+ and pitting   PRETREATMENT AFFECTED LIMB(s): right lower extremity  left lower extremity      Date:  9.30.21 10.14.21        Right / Left           Groin   []      []           8 inches   []      []           4 inches   []      []         PoplitealSpace   [x]      [x] 47/46 45/45         8 inches   [x]      [x] 51/52 49/47.5         4 inches   [x]      [x] 41.5/41 42.5/44.5         Ankle   [x]      [x] 30.5/31 29/28.5         Instep   [x]      [x] 25/26 24/26       Measurements are taken in centimeters:  2.54 cm = 1 inch  Total:right 195cm 189.5       left 196cm 191.5             Physical Skills Involved:  1. Activity Tolerance  2. Edema  3. Skin Integrity  4. pain Cognitive Skills Affected (resulting in the inability to perform in a timely and safe manner):  1. Psychosocial Skills Affected:  1. Habits/Routines   Number of elements that affect the Plan of Care: 3-5:  MODERATE COMPLEXITY   CLINICAL DECISION MAKING:   Outcome Measure: Tool Used: Tool Used: Lymphedema Life Impact Scale   Score:  Initial: 21 Most Recent: X (Date: -- )   Interpretation of Score:  The Lymphedema Life Impact Scale (LLIS) is a validated instrument that measures the physical, functional, and psychosocial concerns pertinent to patients with extremity lymphedema. The Scale's questionnaire is administered to patients to gauge impairments, activity limitations, and participation restrictions resulting from their lymphedema. Score 0 1-13 14-26 27-40 41-54 55-67 68   Modifier CH CI CJ CK CL CM CN     ? Other PT/OT Primary Functional Limitations:     - CURRENT STATUS: CJ - 20%-39% impaired, limited or restricted    - GOAL STATUS: CI - 1%-19% impaired, limited or restricted    - D/C STATUS:  ---------------To be determined---------------       Medical Necessity:   · Skilled intervention continues to be required due to uncontrolled swelling increasing risk of cellulitis and hindering ADL's. Reason for Services/Other Comments:  · Patient continues to require skilled intervention due to inability to self manage lymphedema at this time. Clinical Decision-Making Assessment:     Use of outcome tool(s) and clinical judgement create a POC that gives a: Questionable prediction of patient's progress: MODERATE COMPLEXITY   TREATMENT:   (In addition to Assessment/Re-Assessment sessions the following treatments were rendered)    Pre-treatment Symptoms/Complaints: Reduction of 4.5 cm R LE and 4.5 cm L LE cumulative circumferential volumetric graph measurements. Pain: Initial:   Pain Intensity 1: 4  Post Session:  4   Occupational Therapy Treatments:    Therapeutic Exercise ( minutes):     HEP:  As above; handouts given to patient for all exercises. Manual Therapy:(45 minutes)  Manual lymphatic drainage B LEs and trunk. Skin care with Eucerin followed by compression bandaging.            Manual Lymph Drainage:          Lymph Nodes:    Cervical Supraclavicular Axillary Abdominal Inguinal Popliteal Antecubital   RIGHT X     X    X     -     X     X     -       LEFT X     X     X     -     X    X     -          Anastamoses:   Axillo-axillary Inguino-inguinal Axillo-inguinal Inguino-axillary   ANTERIOR -     -     -     X       POSTERIOR -     -     -     - RIGHT -     -     -     X       LEFT -     -     -     X         Limbs:   -    RUE     -    LUE     X    RLE    X    LLE   ADL/self care: (15 minutes): Following skin care, application of multi layered compression bandaging using biagrip, Rosidal foam and 3 short stretch bandages per leg. Pt instructed to remove if any medical complications ie SOB, increased pain. He has purchased cast boots for showering. Discussed consistent 24/7 compression management in the reduction phase yields best results. Pt verbalized understanding. Treatment/Session Assessment:    · Response to Treatment:  Tolerated treatment without complication. Reduction of 4.5 cm each leg; good compliance with bandaging. · Compliance with Program/Exercises:  Compliant  · Recommendations/Intent for next treatment session: \"Next visit will focus on complete decongestive therapy - reduction phase\".   Total Treatment Duration:60min  OT Patient Time In/Time Out  Time In: 0800  Time Out: 0900    DEAN Love/LIA

## 2021-10-19 ENCOUNTER — HOSPITAL ENCOUNTER (OUTPATIENT)
Dept: PHYSICAL THERAPY | Age: 76
Discharge: HOME | End: 2021-10-19
Payer: MEDICARE

## 2021-10-19 PROCEDURE — 97140 MANUAL THERAPY 1/> REGIONS: CPT

## 2021-10-19 PROCEDURE — 97535 SELF CARE MNGMENT TRAINING: CPT

## 2021-10-19 NOTE — PROGRESS NOTES
Laura Saleh  : 1945  [place]  Secondary: 310 [facility] at [address]  [address], [facility], [address]  [phone]   [phone]           OUTPATIENT OCCUPATIONAL THERAPY: Daily Note 10/19/2021    ICD-10: Treatment Diagnosis: I89.0 lymphedema not elsewhere classified, M79.89 swelling of both LE's. M79.609 pain in limbs  Precautions/Allergies:   Patient has no allergy information on record. Fall Risk Score:    Ambulatory/Rehab Services H2 Model Falls Risk Assessment    Risk Factors:       (1)  Gender [Male] Ability to Rise from Chair:       (1)  Pushes up, successful in one attempt    Falls Prevention Plan: Mobility Assistance Device (specify):  cane   Total: (5 or greater = High Risk): 2     [facility] of [address] Patent #[id number]. Federal Law prohibits the replication, distribution or use without written permission from [facility] [employer]     MD Orders: eval and treat MEDICAL/REFERRING DIAGNOSIS:   Lymphedema, not elsewhere classified [I89.0]   DATE OF ONSET: chronic    REFERRING PHYSICIAN: Magda Martin MD  RETURN PHYSICIAN APPOINTMENT: to be determined      INITIAL ASSESSMENT:  Mr. Laurence Medrano was referred to OT for the evaluation and treatment of bilateral LE lymphedema. Swelling has been present for >8 years. He had therapy for lymphedema including MLD and bandaging some years ago at [facility]. He responded well to therapy but did not continue to wear compression so LE redness and swelling returned and worsened over the years. He had RTHA in 2021 and is being seen for outpatient PT in [facility]. Swelling did not change following surgery. He reports legs are very heavy and interfere with standing, walking, perform ADL's. He has difficulty donning shoes and socks - has sock aid.  He is a recent  but adult grandson lives with him and helps as needed. Mr Radha Cuevas presents with bilateral LE lymphedema with thick/dense fluid, hyperpigmentation, lipodermatosclerosis, skin dryness, pain. He requires skilled OT for complete decongestive therapy to reduce LE swelling before transitioning to compression garments. He has already ordered compression velcro wraps from [facility] and they will be ready Oct 7th. PLAN OF CARE:   PROBLEM LIST:  1. Increased Pain  2. Decreased Activity Tolerance  3. Edema/Girth  4. Decreased Knowledge of Precautions  5. Decreased Skin Integrity/Hygeine INTERVENTIONS PLANNED  1. Skin care  2. Compression bandaging  3. Fitting for compression garment(s)  4. Manual therapy/Manual lymph drainage  5. Therapeutic exercise/Therapeutic activities  6. Patient Education  7. Compression pump trial prn  8.  kinesiotaping    TREATMENT PLAN:  Effective Dates: 9/30/21 to 12/29/2021 Frequency/Duration: 2x/week up to 90 days  2 xweek x90 days  and upon reassessment. Will adjust frequency and duration as progress indicates. GOALS: (Goals have been discussed and agreed upon with patient.)  Short-Term Functional Goals: Time Frame: 45 days  1. The patient/caregiver will verbalize understanding of lymphedema precautions. 2. Patient will be independent with skin care regimen to decrease risk of cellulitis. 3. The patient/caregiver will be independent with self-manual lymph drainage techniques and show decrease in limb volume. 4. Patient will be independent in lymphatic exercises. Discharge Goals: Time Frame: 90 days  1. Patient's bilateral lower extremity circumferential measurements will decrease on volumetric graph by 5-7cm to maximize functional use in ADL's.    2. The patient/caregiver will be independent with home management of lymphedema. 3. Patient/caregiver will be independent donning and doffing bilateral lower extremity compression garment.     Rehabilitation Potential For Stated Goals: Good              The information in this section was collected on 10/19/2021   (except where otherwise noted). OCCUPATIONAL PROFILE & HISTORY:   History of Present Injury/Illness (Reason for Referral):  Mr. Abran Jamison was referred to OT for the evaluation and treatment of bilateral LE lymphedema. Swelling has been present for >8 years. He had therapy for lymphedema including MLD and bandaging some years ago at [facility]. He responded well to therapy but did not continue to wear compression so LE redness and swelling returned and worsened over the years. He had RTHA in July 2021 and is being seen for outpatient PT in [facility]. Swelling did not change following surgery. He reports legs are very heavy and interfere with standing, walking, perform ADL's. He has difficulty donning shoes and socks - has sock aid. He is a recent  but adult grandson lives with him and helps as needed. Mr Abran Jamison presents with bilateral LE lymphedema with thick/dense fluid, hyperpigmentation, lipodermatosclerosis, skin dryness, pain. He requires skilled OT for complete decongestive therapy to reduce LE swelling before transitioning to compression garments. He has already ordered compression velcro wraps from [facility] and they will be ready Oct 7th. Past Medical History/Comorbidities:   Sleep apnea with CPAP, arthritis, cellulitis of LLE, catarcts, DM, GERD, hiatal hernia, HTN, Lymphedema, prostate cancer, varicella, venous insufficiency, RTHA 7/21  Social History/Living Environment:     pt lives in his own home with adult grandson -   Prior Level of Function/Work/Activity:  Works from home  - sits for extended periods of time at desk - very sedentary since [address] in July   Dominant Side:         RIGHT  Previous Treatment Approaches:          Therapy, compression   Current Medications:  No current outpatient medications on file.  Pt will bring list with him to next appt           Date Last Reviewed:  10/19/2021   Complexity Level : Expanded review of therapy/medical records (1-2): MODERATE COMPLEXITY   ASSESSMENT OF OCCUPATIONAL PERFORMANCE:   Palpation:          Bilateral LE lymphedema : thick, dense fluid from foot to knee   ROM:         PT s/p RTHA  Strength:          Overall decreased - generalized debility   Skin Integrity:          Small skin wound on left anterior tibial area that was weeping but now that it is healing weeping has stopped  - pt demonstrates significant skin changes with hyperpigmentation, skin dryness, lipodermatosclerosis     Sensation:  Intact light touch   Functional Mobility:  Cane   Activities of Daily Living : modified independence - has difficulty donning shoes/socks - has sock aid and is wearing slip on shoes   Edema/Girth:  2+ and pitting   PRETREATMENT AFFECTED LIMB(s): right lower extremity  left lower extremity      Date:  9.30.21 10.14.21        Right / Left           Groin   []      []           8 inches   []      []           4 inches   []      []         PoplitealSpace   [x]      [x] 47/46 45/45         8 inches   [x]      [x] 51/52 49/47.5         4 inches   [x]      [x] 41.5/41 42.5/44.5         Ankle   [x]      [x] 30.5/31 29/28.5         Instep   [x]      [x] 25/26 24/26       Measurements are taken in centimeters:  2.54 cm = 1 inch  Total:right 195cm 189.5       left 196cm 191.5             Physical Skills Involved:  1. Activity Tolerance  2. Edema  3. Skin Integrity  4. pain Cognitive Skills Affected (resulting in the inability to perform in a timely and safe manner):  1. Psychosocial Skills Affected:  1. Habits/Routines   Number of elements that affect the Plan of Care: 3-5:  MODERATE COMPLEXITY   CLINICAL DECISION MAKING:   Outcome Measure: Tool Used: Tool Used: Lymphedema Life Impact Scale   Score:  Initial: 21 Most Recent: X (Date: -- )   Interpretation of Score:  The Lymphedema Life Impact Scale (LLIS) is a validated instrument that measures the physical, functional, and psychosocial concerns pertinent to patients with extremity lymphedema. The Scale's questionnaire is administered to patients to gauge impairments, activity limitations, and participation restrictions resulting from their lymphedema. Score 0 1-13 14-26 27-40 41-54 55-67 68   Modifier CH CI CJ CK CL CM CN     ? Other PT/OT Primary Functional Limitations:     - CURRENT STATUS: CJ - 20%-39% impaired, limited or restricted    - GOAL STATUS: CI - 1%-19% impaired, limited or restricted    - D/C STATUS:  ---------------To be determined---------------       Medical Necessity:   · Skilled intervention continues to be required due to uncontrolled swelling increasing risk of cellulitis and hindering ADL's. Reason for Services/Other Comments:  · Patient continues to require skilled intervention due to inability to self manage lymphedema at this time. Clinical Decision-Making Assessment:     Use of outcome tool(s) and clinical judgement create a POC that gives a: Questionable prediction of patient's progress: MODERATE COMPLEXITY   TREATMENT:   (In addition to Assessment/Re-Assessment sessions the following treatments were rendered)    Pre-treatment Symptoms/Complaints: Reduction of 4.5 cm R LE and 4.5 cm L LE cumulative circumferential volumetric graph measurements. He has no new complaints. Pain: Initial:   Pain Intensity 1: 4  Post Session:  4   Occupational Therapy Treatments:    Therapeutic Exercise ( minutes):     HEP:  As above; handouts given to patient for all exercises. Manual Therapy:(45 minutes):Patient arrived without bandages on. He received manual lymphatic drainage B LEs and trunk. Skin care with Eucerin followed by compression bandaging.            Manual Lymph Drainage:          Lymph Nodes:    Cervical Supraclavicular Axillary Abdominal Inguinal Popliteal Antecubital   RIGHT X     X    X     -     X     X     -       LEFT X     X     X     -     X    X     -          Anastamoses:   Axillo-axillary Inguino-inguinal Axillo-inguinal Inguino-axillary ANTERIOR -     -     -     X       POSTERIOR -     -     -     -       RIGHT -     -     -     X       LEFT -     -     -     X         Limbs:   -    RUE     -    LUE     X    RLE    X    LLE   ADL/self care: (15 minutes): Following skin care, application of multi layered compression bandaging using biagrip, Rosidal foam and 3 short stretch bandages per leg. Pt instructed to remove if any medical complications ie SOB, increased pain. He has purchased cast boots for showering. Discussed consistent 24/7 compression management in the reduction phase yields best results. Pt verbalized understanding. Treatment/Session Assessment:    · Response to Treatment:  Tolerated treatment without complication. Reduction of 4.5 cm each leg; good compliance with bandaging. · Compliance with Program/Exercises:  Compliant  · Recommendations/Intent for next treatment session: \"Next visit will focus on complete decongestive therapy - reduction phase\".   Total Treatment Duration:60min  OT Patient Time In/Time Out  Time In: 0800  Time Out: 0900    HawaDEAN Rojas/L, CLT

## 2021-10-21 ENCOUNTER — HOSPITAL ENCOUNTER (OUTPATIENT)
Dept: PHYSICAL THERAPY | Age: 76
Discharge: HOME | End: 2021-10-21
Payer: MEDICARE

## 2021-10-21 PROCEDURE — 97535 SELF CARE MNGMENT TRAINING: CPT

## 2021-10-21 PROCEDURE — 97140 MANUAL THERAPY 1/> REGIONS: CPT

## 2021-10-21 NOTE — PROGRESS NOTES
Kristin Saxena  : 1945  [place]  Secondary: 310 [facility] at [address] [facility]  [address], [address]  [phone]   [phone]           OUTPATIENT OCCUPATIONAL THERAPY: Daily Note 10/21/2021    ICD-10: Treatment Diagnosis: I89.0 lymphedema not elsewhere classified, M79.89 swelling of both LE's. M79.609 pain in limbs  Precautions/Allergies:   Patient has no allergy information on record. Fall Risk Score:    Ambulatory/Rehab Services H2 Model Falls Risk Assessment    Risk Factors:       (1)  Gender [Male] Ability to Rise from Chair:       (1)  Pushes up, successful in one attempt    Falls Prevention Plan: Mobility Assistance Device (specify):  cane   Total: (5 or greater = High Risk): 2     [facility] of [address] Patent #[id number]. Federal Law prohibits the replication, distribution or use without written permission from [facility]ick     MD Orders: eval and treat MEDICAL/REFERRING DIAGNOSIS:   Lymphedema, not elsewhere classified [I89.0]   DATE OF ONSET: chronic    REFERRING PHYSICIAN: Alee Martinez MD  RETURN PHYSICIAN APPOINTMENT: to be determined      INITIAL ASSESSMENT:  Mr. Robson Damico was referred to OT for the evaluation and treatment of bilateral LE lymphedema. Swelling has been present for >8 years. He had therapy for lymphedema including MLD and bandaging some years ago at [facility]. He responded well to therapy but did not continue to wear compression so LE redness and swelling returned and worsened over the years. He had RTHA in 2021 and is being seen for outpatient PT in [facility]. Swelling did not change following surgery. He reports legs are very heavy and interfere with standing, walking, perform ADL's. He has difficulty donning shoes and socks - has sock aid.  He is a recent  but adult grandson lives with him and helps as needed. Mr Clovis Marrero presents with bilateral LE lymphedema with thick/dense fluid, hyperpigmentation, lipodermatosclerosis, skin dryness, pain. He requires skilled OT for complete decongestive therapy to reduce LE swelling before transitioning to compression garments. He [place] [facility] [place] ready Oct 7th. PLAN OF CARE:   PROBLEM LIST:  1. Increased Pain  2. Decreased Activity Tolerance  3. Edema/Girth  4. Decreased Knowledge of Precautions  5. Decreased Skin Integrity/Hygeine INTERVENTIONS PLANNED  1. Skin care  2. Compression bandaging  3. Fitting for compression garment(s)  4. Manual therapy/Manual lymph drainage  5. Therapeutic exercise/Therapeutic activities  6. Patient Education  7. Compression pump trial prn  8.  kinesiotaping    TREATMENT PLAN:  Effective Dates: 9/30/21 to 12/29/2021 Frequency/Duration: 2x/week up to 90 days  2 xweek x90 days  and upon reassessment. Will adjust frequency and duration as progress indicates. GOALS: (Goals have been discussed and agreed upon with patient.)  Short-Term Functional Goals: Time Frame: 45 days  1. The patient/caregiver will verbalize understanding of lymphedema precautions. 2. Patient will be independent with skin care regimen to decrease risk of cellulitis. 3. The patient/caregiver will be independent with self-manual lymph drainage techniques and show decrease in limb volume. 4. Patient will be independent in lymphatic exercises. Discharge Goals: Time Frame: 90 days  1. Patient's bilateral lower extremity circumferential measurements will decrease on volumetric graph by 5-7cm to maximize functional use in ADL's.    2. The patient/caregiver will be independent with home management of lymphedema. 3. Patient/caregiver will be independent donning and doffing bilateral lower extremity compression garment.     Rehabilitation Potential For Stated Goals: Good              The information in this section was collected on 10/21/2021   (except where otherwise noted). OCCUPATIONAL PROFILE & HISTORY:   History of Present Injury/Illness (Reason for Referral):  Mr. Juan Forde was referred to OT for the evaluation and treatment of bilateral LE lymphedema. Swelling has been present for >8 years. He had therapy for lymphedema including MLD and bandaging some years ago at [facility]. He responded well to therapy but did not continue to wear compression so LE redness and swelling returned and worsened over the years. He had RTHA in July 2021 and is being seen for outpatient PT in [facility]. Swelling did not change following surgery. He reports legs are very heavy and interfere with standing, walking, perform ADL's. He has difficulty donning shoes and socks - has sock aid. He is a recent  but adult grandson lives with him and helps as needed. Mr Juan Forde presents with bilateral LE lymphedema with thick/dense fluid, hyperpigmentation, lipodermatosclerosis, skin dryness, pain. He requires skilled OT for complete decongestive therapy to reduce LE swelling before transitioning to compression garments. He has already [place] compression [place] [facility] [place] ready Oct 7th. Past Medical History/Comorbidities:   Sleep apnea with CPAP, arthritis, cellulitis of LLE, catarcts, DM, GERD, hiatal hernia, HTN, Lymphedema, prostate cancer, varicella, venous insufficiency, RTHA 7/21  Social History/Living Environment:     pt lives in his own home with adult grandson -   Prior Level of Function/Work/Activity:  Works from home  - sits for extended periods of time at desk - very sedentary since [address] in July   Dominant Side:         RIGHT  Previous Treatment Approaches:          Therapy, compression   Current Medications:  No current outpatient medications on file.  Pt will bring list with him to next appt           Date Last Reviewed:  10/21/2021   Complexity Level : Expanded review of therapy/medical records (1-2): MODERATE COMPLEXITY   ASSESSMENT OF OCCUPATIONAL PERFORMANCE:   Palpation:          Bilateral LE lymphedema : thick, dense fluid from foot to knee   ROM:         PT s/p RTHA  Strength:          Overall decreased - generalized debility   Skin Integrity:          Small skin wound on left anterior tibial area that was weeping but now that it is healing weeping has stopped  - pt demonstrates significant skin changes with hyperpigmentation, skin dryness, lipodermatosclerosis     Sensation:  Intact light touch   Functional Mobility:  Cane   Activities of Daily Living : modified independence - has difficulty donning shoes/socks - has sock aid and is wearing slip on shoes   Edema/Girth:  2+ and pitting   PRETREATMENT AFFECTED LIMB(s): right lower extremity  left lower extremity      Date:  9.30.21 10.14.21 10.21.21       Right / Left           Groin   []      []           8 inches   []      []           4 inches   []      []         PoplitealSpace   [x]      [x] 47/46 45/45 44/44        8 inches   [x]      [x] 51/52 49/47.5 45.5/46.5        4 inches   [x]      [x] 41.5/41 42.5/44.5 39.5/39.5        Ankle   [x]      [x] 30.5/31 29/28.5 28/27.5        Instep   [x]      [x] 25/26 24/26 24/25      Measurements are taken in centimeters:  2.54 cm = 1 inch  Total:right 195cm 189.5 181      left 196cm 191.5 182. 5            Physical Skills Involved:  1. Activity Tolerance  2. Edema  3. Skin Integrity  4. pain Cognitive Skills Affected (resulting in the inability to perform in a timely and safe manner):  1. Psychosocial Skills Affected:  1. Habits/Routines   Number of elements that affect the Plan of Care: 3-5:  MODERATE COMPLEXITY   CLINICAL DECISION MAKING:   Outcome Measure: Tool Used: Tool Used: Lymphedema Life Impact Scale   Score:  Initial: 21 Most Recent: X (Date: -- )   Interpretation of Score:  The Lymphedema Life Impact Scale (LLIS) is a validated instrument that measures the physical, functional, and psychosocial concerns pertinent to patients with extremity lymphedema. The Scale's questionnaire is administered to patients to gauge impairments, activity limitations, and participation restrictions resulting from their lymphedema. Score 0 1-13 14-26 27-40 41-54 55-67 68   Modifier CH CI CJ CK CL CM CN     ? Other PT/OT Primary Functional Limitations:     - CURRENT STATUS: CJ - 20%-39% impaired, limited or restricted    - GOAL STATUS: CI - 1%-19% impaired, limited or restricted    - D/C STATUS:  ---------------To be determined---------------       Medical Necessity:   · Skilled intervention continues to be required due to uncontrolled swelling increasing risk of cellulitis and hindering ADL's. Reason for Services/Other Comments:  · Patient continues to require skilled intervention due to inability to self manage lymphedema at this time. Clinical Decision-Making Assessment:     Use of outcome tool(s) and clinical judgement create a POC that gives a: Questionable prediction of patient's progress: MODERATE COMPLEXITY   TREATMENT:   (In addition to Assessment/Re-Assessment sessions the following treatments were rendered)    Pre-treatment Symptoms/Complaints: Reduction of 14 cm R LE and 13.5 cm L LE cumulative circumferential volumetric graph measurements. He has no new complaints. Pain: Initial:   Pain Intensity 1: 4  Post Session:  4   Occupational Therapy Treatments:    Therapeutic Exercise ( minutes):     HEP:  As above; handouts given to patient for all exercises. Manual Therapy:(45 minutes):Patient arrived without bandages on. He received manual lymphatic drainage B LEs and trunk. Skin care with Eucerin followed by compression bandaging.            Manual Lymph Drainage:          Lymph Nodes:    Cervical Supraclavicular Axillary Abdominal Inguinal Popliteal Antecubital   RIGHT X     X    X     -     X     X     -       LEFT X     X     X     -     X    X     -          Anastamoses: Axillo-axillary Inguino-inguinal Axillo-inguinal Inguino-axillary   ANTERIOR -     -     -     X       POSTERIOR -     -     -     -       RIGHT -     -     -     X       LEFT -     -     -     X         Limbs:   -    RUE     -    LUE     X    RLE    X    LLE   ADL/self care: (15 minutes): Following skin care, application of multi layered compression bandaging using biagrip, Rosidal foam and 3 short stretch bandages per leg. Pt instructed to remove if any medical complications ie SOB, increased pain. He has purchased cast boots for showering. Discussed consistent 24/7 compression management in the reduction phase yields best results. Pt verbalized understanding. Treatment/Session Assessment:    · Response to Treatment:  Tolerated treatment without complication. Reduction of 13.5 to 14 cm each leg; good compliance with bandaging. · Compliance with Program/Exercises:  Compliant  · Recommendations/Intent for next treatment session: \"Next visit will focus on transition to velcro wraps for compression management.   Total Treatment Duration:60min  OT Patient Time In/Time Out  Time In: 0800  Time Out: 0900    DEAN Ennis/LIA, CLT

## 2021-10-26 ENCOUNTER — HOSPITAL ENCOUNTER (OUTPATIENT)
Dept: PHYSICAL THERAPY | Age: 76
Discharge: HOME | End: 2021-10-26
Payer: MEDICARE

## 2021-10-26 PROCEDURE — 97535 SELF CARE MNGMENT TRAINING: CPT

## 2021-10-26 PROCEDURE — 97140 MANUAL THERAPY 1/> REGIONS: CPT

## 2021-10-26 NOTE — PROGRESS NOTES
Germán Wyman  : 1945  [place]  Secondary: 310 [facility] at [facility] Therapy  [address], [address]  [phone]   [phone]           OUTPATIENT OCCUPATIONAL THERAPY: Daily Note 10/26/2021    ICD-10: Treatment Diagnosis: I89.0 lymphedema not elsewhere classified, M79.89 swelling of both LE's. M79.609 pain in limbs  Precautions/Allergies:   Patient has no allergy information on record. Fall Risk Score:    Ambulatory/Rehab Services H2 Model Falls Risk Assessment    Risk Factors:       (1)  Gender [Male] Ability to Rise from Chair:       (1)  Pushes up, successful in one attempt    Falls Prevention Plan: Mobility Assistance Device (specify):  cane   Total: (5 or greater = High Risk): 2     [facility] of [place] [address]. [facility] Patent #[id number]. Federal Law prohibits the replication, distribution or use without written permission from [facility]     MD Orders: eval and treat MEDICAL/REFERRING DIAGNOSIS:   Lymphedema, not elsewhere classified [I89.0]   DATE OF ONSET: chronic    REFERRING PHYSICIAN: Anabell Bernal MD  RETURN PHYSICIAN APPOINTMENT: to be determined      INITIAL ASSESSMENT:  Mr. Abran Jamison was referred to OT for the evaluation and treatment of bilateral LE lymphedema. Swelling has been present for >8 years. He had therapy for lymphedema including MLD and bandaging some years ago at [facility]. He responded well to therapy but did not continue to wear compression so LE redness and swelling returned and worsened over the years. He had RTHA in 2021 and is being seen for outpatient PT in [facility]. Swelling did not change following surgery. He reports legs are very heavy and interfere with standing, walking, perform ADL's. He has difficulty donning shoes and socks - has sock aid.  He is a recent  but adult grandson lives with him and helps as needed. Mr Mihaela Farfan presents with bilateral LE lymphedema with thick/dense fluid, hyperpigmentation, lipodermatosclerosis, skin dryness, pain. He requires skilled OT for complete decongestive therapy to reduce LE swelling before transitioning to compression garments. He has already ordered compression velcro wraps from [facility] [place] and they will be ready Oct 7th. PLAN OF CARE:   PROBLEM LIST:  1. Increased Pain  2. Decreased Activity Tolerance  3. Edema/Girth  4. Decreased Knowledge of Precautions  5. Decreased Skin Integrity/Hygeine INTERVENTIONS PLANNED  1. Skin care  2. Compression bandaging  3. Fitting for compression garment(s)  4. Manual therapy/Manual lymph drainage  5. Therapeutic exercise/Therapeutic activities  6. Patient Education  7. Compression pump trial prn  8.  kinesiotaping    TREATMENT PLAN:  Effective Dates: 9/30/21 to 12/29/2021 Frequency/Duration: 2x/week up to 90 days  2 xweek x90 days  and upon reassessment. Will adjust frequency and duration as progress indicates. GOALS: (Goals have been discussed and agreed upon with patient.)  Short-Term Functional Goals: Time Frame: 45 days  1. The patient/caregiver will verbalize understanding of lymphedema precautions. 2. Patient will be independent with skin care regimen to decrease risk of cellulitis. 3. The patient/caregiver will be independent with self-manual lymph drainage techniques and show decrease in limb volume. 4. Patient will be independent in lymphatic exercises. Discharge Goals: Time Frame: 90 days  1. Patient's bilateral lower extremity circumferential measurements will decrease on volumetric graph by 5-7cm to maximize functional use in ADL's.    2. The patient/caregiver will be independent with home management of lymphedema. 3. Patient/caregiver will be independent donning and doffing bilateral lower extremity compression garment.     Rehabilitation Potential For Stated Goals: Good              The information in this section was collected on 10/26/2021   (except where otherwise noted). OCCUPATIONAL PROFILE & HISTORY:   History of Present Injury/Illness (Reason for Referral):  Mr. Margarita Langley was referred to OT for the evaluation and treatment of bilateral LE lymphedema. Swelling has been present for >8 years. He had therapy for lymphedema including MLD and bandaging some years ago at [facility]. He responded well to therapy but did not continue to wear compression so LE redness and swelling returned and worsened over the years. He had RTHA in July 2021 and is being seen for outpatient PT in [facility]. Swelling did not change following surgery. He reports legs are very heavy and interfere with standing, walking, perform ADL's. He has difficulty donning shoes and socks - has sock aid. He is a recent  but adult grandson lives with him and helps as needed. Mr Margarita Langley presents with bilateral LE lymphedema with thick/dense fluid, hyperpigmentation, lipodermatosclerosis, skin dryness, pain. He requires skilled OT for complete decongestive therapy to reduce LE swelling before transitioning to compression garments. He has already ordered compression velcro wraps from [facility] [place] [place] and they will be ready Oct 7th. Past Medical History/Comorbidities:   Sleep apnea with CPAP, arthritis, cellulitis of LLE, catarcts, DM, GERD, hiatal hernia, HTN, Lymphedema, prostate cancer, varicella, venous insufficiency, RTHA 7/21  Social History/Living Environment:     pt lives in his own home with adult grandson -   Prior Level of Function/Work/Activity:  Works from home  - sits for extended periods of time at desk - very sedentary since [address] in July   Dominant Side:         RIGHT  Previous Treatment Approaches:          Therapy, compression   Current Medications:  No current outpatient medications on file.  Pt will bring list with him to next appt           Date Last Reviewed:  10/26/2021   Complexity Level : Expanded review of therapy/medical records (1-2): MODERATE COMPLEXITY   ASSESSMENT OF OCCUPATIONAL PERFORMANCE:   Palpation:          Bilateral LE lymphedema : thick, dense fluid from foot to knee   ROM:         PT s/p RTHA  Strength:          Overall decreased - generalized debility   Skin Integrity:          Small skin wound on left anterior tibial area that was weeping but now that it is healing weeping has stopped  - pt demonstrates significant skin changes with hyperpigmentation, skin dryness, lipodermatosclerosis     Sensation:  Intact light touch   Functional Mobility:  Cane   Activities of Daily Living : modified independence - has difficulty donning shoes/socks - has sock aid and is wearing slip on shoes   Edema/Girth:  2+ and pitting   PRETREATMENT AFFECTED LIMB(s): right lower extremity  left lower extremity      Date:  9.30.21 10.14.21 10.21.21       Right / Left           Groin   []      []           8 inches   []      []           4 inches   []      []         PoplitealSpace   [x]      [x] 47/46 45/45 44/44        8 inches   [x]      [x] 51/52 49/47.5 45.5/46.5        4 inches   [x]      [x] 41.5/41 42.5/44.5 39.5/39.5        Ankle   [x]      [x] 30.5/31 29/28.5 28/27.5        Instep   [x]      [x] 25/26 24/26 24/25      Measurements are taken in centimeters:  2.54 cm = 1 inch  Total:right 195cm 189.5 181      left 196cm 191.5 182. 5            Physical Skills Involved:  1. Activity Tolerance  2. Edema  3. Skin Integrity  4. pain Cognitive Skills Affected (resulting in the inability to perform in a timely and safe manner):  1. Psychosocial Skills Affected:  1. Habits/Routines   Number of elements that affect the Plan of Care: 3-5:  MODERATE COMPLEXITY   CLINICAL DECISION MAKING:   Outcome Measure: Tool Used: Tool Used: Lymphedema Life Impact Scale   Score:  Initial: 21 Most Recent: X (Date: -- )   Interpretation of Score:  The Lymphedema Life Impact Scale (LLIS) is a validated instrument that measures the physical, functional, and psychosocial concerns pertinent to patients with extremity lymphedema. The Scale's questionnaire is administered to patients to gauge impairments, activity limitations, and participation restrictions resulting from their lymphedema. Score 0 1-13 14-26 27-40 41-54 55-67 68   Modifier CH CI CJ CK CL CM CN     ? Other PT/OT Primary Functional Limitations:     - CURRENT STATUS: CJ - 20%-39% impaired, limited or restricted    - GOAL STATUS: CI - 1%-19% impaired, limited or restricted    - D/C STATUS:  ---------------To be determined---------------       Medical Necessity:   · Skilled intervention continues to be required due to uncontrolled swelling increasing risk of cellulitis and hindering ADL's. Reason for Services/Other Comments:  · Patient continues to require skilled intervention due to inability to self manage lymphedema at this time. Clinical Decision-Making Assessment:     Use of outcome tool(s) and clinical judgement create a POC that gives a: Questionable prediction of patient's progress: MODERATE COMPLEXITY   TREATMENT:   (In addition to Assessment/Re-Assessment sessions the following treatments were rendered)    Pre-treatment Symptoms/Complaints: Transitioning to velcro wraps purchased prior to initiation of therapy. Pain: Initial:   Pain Intensity 1: 4  Post Session:  4   Occupational Therapy Treatments:    Therapeutic Exercise ( minutes):     HEP:  As above; handouts given to patient for all exercises. Manual Therapy:(45 minutes):Patient arrived without bandages on. He received manual lymphatic drainage B LEs and trunk. Skin care with Eucerin followed by compression bandaging.            Manual Lymph Drainage:          Lymph Nodes:    Cervical Supraclavicular Axillary Abdominal Inguinal Popliteal Antecubital   RIGHT X     X    X     -     X     X     -       LEFT X     X     X     -     X    X     -          Anastamoses:   Axillo-axillary Inguino-inguinal Axillo-inguinal Inguino-axillary   ANTERIOR -     -     -     X       POSTERIOR -     -     -     -       RIGHT -     -     -     X       LEFT -     -     -     X         Limbs:   -    RUE     -    LUE     X    RLE    X    LLE   ADL/self care: (15 minutes): Following skin care, application of velcro wrap to L LE; bandaged R LE as patient needs to return XL wrap and get L. Therapist to order STACIA liners to wear under wraps. Treatment/Session Assessment:    · Response to Treatment:  Tolerated treatment without complication. Reduction of 13.5 to 14 cm each leg; good compliance with bandaging. Transitioning to velcro wraps. · Compliance with Program/Exercises:  Compliant  · Recommendations/Intent for next treatment session: \"Next visit will focus on transition to velcro wraps for compression management.   Total Treatment Duration:60min  OT Patient Time In/Time Out  Time In: 0800  Time Out: 0900    DEAN Hilliard/JOSIAH FITZGERALDT

## 2021-10-28 ENCOUNTER — APPOINTMENT (OUTPATIENT)
Dept: PHYSICAL THERAPY | Age: 76
End: 2021-10-28
Payer: MEDICARE

## 2021-11-02 ENCOUNTER — HOSPITAL ENCOUNTER (OUTPATIENT)
Dept: PHYSICAL THERAPY | Age: 76
Discharge: HOME | End: 2021-11-02
Payer: MEDICARE

## 2021-11-02 PROCEDURE — 97140 MANUAL THERAPY 1/> REGIONS: CPT

## 2021-11-02 PROCEDURE — 97535 SELF CARE MNGMENT TRAINING: CPT

## 2021-11-02 NOTE — PROGRESS NOTES
Mehreen Roman  : 1945  [place]  Secondary: 310 [facility] at [address]  6200 [facility], [address]  [phone]   [phone]           OUTPATIENT OCCUPATIONAL THERAPY: Daily Note and Progress Report 2021    ICD-10: Treatment Diagnosis: I89.0 lymphedema not elsewhere classified, M79.89 swelling of both LE's. M79.609 pain in limbs  Precautions/Allergies:   Patient has no allergy information on record. Fall Risk Score:    Ambulatory/Rehab Services H2 Model Falls Risk Assessment    Risk Factors:       (1)  Gender [Male] Ability to Rise from Chair:       (1)  Pushes up, successful in one attempt    Falls Prevention Plan: Mobility Assistance Device (specify):  cane   Total: (5 or greater = High Risk): 2     [facility] of [address] Patent #[id number]. Federal Law prohibits the replication, distribution or use without written permission from [facility] [employer]     MD Orders: eval and treat MEDICAL/REFERRING DIAGNOSIS:   Lymphedema, not elsewhere classified [I89.0]   DATE OF ONSET: chronic    REFERRING PHYSICIAN: Glenis Durán MD  RETURN PHYSICIAN APPOINTMENT: to be determined      PROGRESS NOTE 21:  Mr. Yolanda Gloria has completed 9 lymphedema treatment sessions from 21 through 21. Pt was educated on lymphatic pathophysiology, complete decongestive therapy, precautions and skin integrity management. He received manual lymphatic drainage and compression bandaging. He demonstrated reduction of 14 cm R LE and 13.5 cm L LE cumulative circumferential volumetric measurements. Today, he has had an increase in measurements with excessively dry skin and ongoing tissue issues. Mr. Yolanda Gloria did not address skin care or wear compression [place].  He requires ongoing therapy to achieve reduction and self management with compression garments, possibly a pump for home use, and exercise. INITIAL ASSESSMENT:  Mr. Delaney Lainez was referred to OT for the evaluation and treatment of bilateral LE lymphedema. Swelling has been present for >8 years. He had therapy for lymphedema including MLD and bandaging some years ago at [facility]. He responded well to therapy but did not continue to wear compression so LE redness and swelling returned and worsened over the years. He had RTHA in July 2021 and is being seen for outpatient PT in [facility]. Swelling did not change following surgery. He reports legs are very heavy and interfere with standing, walking, perform ADL's. He has difficulty donning shoes and socks - has sock aid. He is a recent  but adult grandson lives with him and helps as needed. Mr Delaney Lainez presents with bilateral LE lymphedema with thick/dense fluid, hyperpigmentation, lipodermatosclerosis, skin dryness, pain. He requires skilled OT for complete decongestive therapy to reduce LE swelling before transitioning to compression garments. He has already ordered compression velcro wraps from [facility] and they will be ready Oct 7th. PLAN OF CARE:   PROBLEM LIST:  1. Increased Pain  2. Decreased Activity Tolerance  3. Edema/Girth  4. Decreased Knowledge of Precautions  5. Decreased Skin Integrity/Hygeine INTERVENTIONS PLANNED  1. Skin care  2. Compression bandaging  3. Fitting for compression garment(s)  4. Manual therapy/Manual lymph drainage  5. Therapeutic exercise/Therapeutic activities  6. Patient Education  7. Compression pump trial prn  8.  kinesiotaping    TREATMENT PLAN:  Effective Dates: 9/30/21 to 12/29/2021 Frequency/Duration: 2x/week up to 90 days  2 xweek x90 days  and upon reassessment. Will adjust frequency and duration as progress indicates. GOALS: (Goals have been discussed and agreed upon with patient.)  Short-Term Functional Goals: Time Frame: 45 days  1.  The patient/caregiver will verbalize understanding of lymphedema precautions. Goal met  2. Patient will be independent with skin care regimen to decrease risk of cellulitis. Goal met  3. The patient/caregiver will be independent with self-manual lymph drainage techniques and show decrease in limb volume. Progressing  4. Patient will be independent in lymphatic exercises. Progressing  Discharge Goals: Time Frame: 90 days  1. Patient's bilateral lower extremity circumferential measurements will decrease on volumetric graph by 5-7cm to maximize functional use in ADL's. Progressing  2. The patient/caregiver will be independent with home management of lymphedema. Progressing  3. Patient/caregiver will be independent donning and doffing bilateral lower extremity compression garment. Progressing    Rehabilitation Potential For Stated Goals: Good              The information in this section was collected on 11/2/2021   (except where otherwise noted). OCCUPATIONAL PROFILE & HISTORY:   History of Present Injury/Illness (Reason for Referral):  Mr. Yeimy Watson was referred to OT for the evaluation and treatment of bilateral LE lymphedema. Swelling has been present for >8 years. He had therapy for lymphedema including MLD and bandaging some years ago at [facility]. He responded well to therapy but did not continue to wear compression so LE redness and swelling returned and worsened over the years. He had RTHA in July 2021 and is being seen for outpatient PT in [facility]. Swelling did not change following surgery. He reports legs are very heavy and interfere with standing, walking, perform ADL's. He has difficulty donning shoes and socks - has sock aid. He is a recent  but adult grandson lives with him and helps as needed. Mr Yeimy Watson presents with bilateral LE lymphedema with thick/dense fluid, hyperpigmentation, lipodermatosclerosis, skin dryness, pain.  He requires skilled OT for complete decongestive therapy to reduce LE swelling before transitioning to compression garments. He has already ordered compression velcro wraps [facility] and they will be ready Oct 7th. Past Medical History/Comorbidities:   Sleep apnea with CPAP, arthritis, cellulitis of LLE, catarcts, DM, GERD, hiatal hernia, HTN, Lymphedema, prostate cancer, varicella, venous insufficiency, RTHA 7/21  Social History/Living Environment:     pt lives in his own home with adult grandson -   Prior Level of Function/Work/Activity:  Works from home  - sits for extended periods of time at desk - very sedentary since [address] in July   Dominant Side:         RIGHT  Previous Treatment Approaches:          Therapy, compression   Current Medications:  No current outpatient medications on file.  Pt will bring list with him to next appt           Date Last Reviewed:  11/2/2021   Complexity Level : Expanded review of therapy/medical records (1-2):  MODERATE COMPLEXITY   ASSESSMENT OF OCCUPATIONAL PERFORMANCE:   Palpation:          Bilateral LE lymphedema : thick, dense fluid from foot to knee   ROM:         PT s/p RTHA  Strength:          Overall decreased - generalized debility   Skin Integrity:          Small skin wound on left anterior tibial area that was weeping but now that it is healing weeping has stopped  - pt demonstrates significant skin changes with hyperpigmentation, skin dryness, lipodermatosclerosis     Sensation:  Intact light touch   Functional Mobility:  Cane   Activities of Daily Living : modified independence - has difficulty donning shoes/socks - has sock aid and is wearing slip on shoes   Edema/Girth:  2+ and pitting   PRETREATMENT AFFECTED LIMB(s): right lower extremity  left lower extremity      Date:  9.30.21 10.14.21 10.21.21 11.2.21      Right / Left           Groin   []      []           8 inches   []      []           4 inches   []      []         PoplitealSpace   [x]      [x] 47/46 45/45 44/44 46/45       8 inches   [x]      [x] 51/52 49/47.5 45.5/46.5 55/50.5       4 inches   [x]      [x] 41.5/41 42.5/44.5 39.5/39.5 42/44       Ankle   [x]      [x] 30.5/31 29/28.5 28/27.5 29/29.5       Instep   [x]      [x] 25/26 24/26 24/25 24/25     Measurements are taken in centimeters:  2.54 cm = 1 inch  Total:right 195cm 189.5 181 196 cm     left 196cm 191.5 182.5 194 cm           Physical Skills Involved:  1. Activity Tolerance  2. Edema  3. Skin Integrity  4. pain Cognitive Skills Affected (resulting in the inability to perform in a timely and safe manner):  1. Psychosocial Skills Affected:  1. Habits/Routines   Number of elements that affect the Plan of Care: 3-5:  MODERATE COMPLEXITY   CLINICAL DECISION MAKING:   Outcome Measure: Tool Used: Tool Used: Lymphedema Life Impact Scale   Score:  Initial: 21 Most Recent: 18 (Date: 11/2/21)   Interpretation of Score: The Lymphedema Life Impact Scale (LLIS) is a validated instrument that measures the physical, functional, and psychosocial concerns pertinent to patients with extremity lymphedema. The Scale's questionnaire is administered to patients to gauge impairments, activity limitations, and participation restrictions resulting from their lymphedema. Score 0 1-13 14-26 27-40 41-54 55-67 68   Modifier CH CI CJ CK CL CM CN     ? Other PT/OT Primary Functional Limitations:     - CURRENT STATUS: CJ - 20%-39% impaired, limited or restricted    - GOAL STATUS: CI - 1%-19% impaired, limited or restricted    - D/C STATUS:  ---------------To be determined---------------       Medical Necessity:   · Skilled intervention continues to be required due to uncontrolled swelling increasing risk of cellulitis and hindering ADL's. Reason for Services/Other Comments:  · Patient continues to require skilled intervention due to inability to self manage lymphedema at this time.   Clinical Decision-Making Assessment:     Use of outcome tool(s) and clinical judgement create a POC that gives a: Questionable prediction of patient's progress: MODERATE COMPLEXITY   TREATMENT:   (In addition to Assessment/Re-Assessment sessions the following treatments were rendered)    Pre-treatment Symptoms/Complaints: \"I haven't worn any bandages since Thursday\"  Pain: Initial:   Pain Intensity 1: 4  Post Session:  4   Occupational Therapy Treatments:    Therapeutic Exercise ( minutes):     HEP:  As above; handouts given to patient for all exercises. Manual Therapy:(45 minutes):  He received manual lymphatic drainage B LEs and trunk. Skin care with Eucerin followed by compression bandaging. Manual Lymph Drainage:          Lymph Nodes:    Cervical Supraclavicular Axillary Abdominal Inguinal Popliteal Antecubital   RIGHT X     X    X     -     X     X     -       LEFT X     X     X     -     X    X     -          Anastamoses:   Axillo-axillary Inguino-inguinal Axillo-inguinal Inguino-axillary   ANTERIOR -     -     -     X       POSTERIOR -     -     -     -       RIGHT -     -     -     X       LEFT -     -     -     X         Limbs:   -    RUE     -    LUE     X    RLE    X    LLE   ADL/self care: (15 minutes): Following skin care, bandaged R & L LEs with biagrip and 2 short stretch bandages  Treatment/Session Assessment:    · Response to Treatment:  Tolerated treatment without complication. Pt's circumferential measurements returned to original size; will continue therapy to achieve reduction and self management. Submitted referral to [employer] for home compression pump. · Compliance with Program/Exercises:  Poor compliance this past week  · Recommendations/Intent for next treatment session: \"Next visit will focus on reduction and self-management of B LE lymphedema.   Total Treatment Duration:60min  OT Patient Time In/Time Out  Time In: 0810  Time Out: DEAN Mantilla/L, CLT

## 2021-11-04 ENCOUNTER — HOSPITAL ENCOUNTER (OUTPATIENT)
Dept: PHYSICAL THERAPY | Age: 76
Discharge: HOME | End: 2021-11-04
Payer: MEDICARE

## 2021-11-04 PROCEDURE — 97140 MANUAL THERAPY 1/> REGIONS: CPT

## 2021-11-04 PROCEDURE — 97535 SELF CARE MNGMENT TRAINING: CPT

## 2021-11-04 NOTE — PROGRESS NOTES
Helena Lee  : 1945  [place]  Secondary: 310 [facility] at [address]  [address], [address]  [phone]   [phone]           OUTPATIENT OCCUPATIONAL THERAPY: Daily Note 2021    ICD-10: Treatment Diagnosis: I89.0 lymphedema not elsewhere classified, M79.89 swelling of both LE's. M79.609 pain in limbs  Precautions/Allergies:   Patient has no allergy information on record. Fall Risk Score:    Ambulatory/Rehab Services H2 Model Falls Risk Assessment    Risk Factors:       (1)  Gender [Male] Ability to Rise from Chair:       (1)  Pushes up, successful in one attempt    Falls Prevention Plan: Mobility Assistance Device (specify):  cane   Total: (5 or greater = High Risk): 2     [facility] of [address] [place] Patent #[id number]. Federal Law prohibits the replication, distribution or use without written permission from [facility] [employer]     MD Orders: eval and treat MEDICAL/REFERRING DIAGNOSIS:   Lymphedema, not elsewhere classified [I89.0]   DATE OF ONSET: chronic    REFERRING PHYSICIAN: Pelon Callejas MD  RETURN PHYSICIAN APPOINTMENT: to be determined      PROGRESS NOTE 21:  Mr. Artem Ashford has completed 9 lymphedema treatment sessions from 21 through 21. Pt was educated on lymphatic pathophysiology, complete decongestive therapy, precautions and skin integrity management. He received manual lymphatic drainage and compression bandaging. He demonstrated reduction of 14 cm R LE and 13.5 cm L LE cumulative circumferential volumetric measurements. Today, he has had an increase in measurements with excessively dry skin and ongoing tissue issues. Mr. Artem Ashford did not address skin care or wear compression [place]  He requires ongoing therapy to achieve reduction and self management with compression garments, possibly a pump for home use, and exercise. INITIAL ASSESSMENT:  Mr. Daniel Sapp was referred to OT for the evaluation and treatment of bilateral LE lymphedema. Swelling has been present for >8 years. He had therapy for lymphedema including MLD and bandaging some years ago at [facility]. He responded well to therapy but did not continue to wear compression so LE redness and swelling returned and worsened over the years. He had RTHA in July 2021 and is being seen for outpatient PT in [facility]. Swelling did not change following surgery. He reports legs are very heavy and interfere with standing, walking, perform ADL's. He has difficulty donning shoes and socks - has sock aid. He is a recent  but adult grandson lives with him and helps as needed. Mr Daniel Sapp presents with bilateral LE lymphedema with thick/dense fluid, hyperpigmentation, lipodermatosclerosis, skin dryness, pain. He requires skilled OT for complete decongestive therapy to reduce LE swelling before transitioning to compression garments. He has already ordered compression velcro wraps from [facility] and they will be ready Oct 7th. PLAN OF CARE:   PROBLEM LIST:  1. Increased Pain  2. Decreased Activity Tolerance  3. Edema/Girth  4. Decreased Knowledge of Precautions  5. Decreased Skin Integrity/Hygeine INTERVENTIONS PLANNED  1. Skin care  2. Compression bandaging  3. Fitting for compression garment(s)  4. Manual therapy/Manual lymph drainage  5. Therapeutic exercise/Therapeutic activities  6. Patient Education  7. Compression pump trial prn  8.  kinesiotaping    TREATMENT PLAN:  Effective Dates: 9/30/21 to 12/29/2021 Frequency/Duration: 2x/week up to 90 days  2 xweek x90 days  and upon reassessment. Will adjust frequency and duration as progress indicates. GOALS: (Goals have been discussed and agreed upon with patient.)  Short-Term Functional Goals: Time Frame: 45 days  1.  The patient/caregiver will verbalize understanding of lymphedema precautions. Goal met  2. Patient will be independent with skin care regimen to decrease risk of cellulitis. Goal met  3. The patient/caregiver will be independent with self-manual lymph drainage techniques and show decrease in limb volume. Progressing  4. Patient will be independent in lymphatic exercises. Progressing  Discharge Goals: Time Frame: 90 days  1. Patient's bilateral lower extremity circumferential measurements will decrease on volumetric graph by 5-7cm to maximize functional use in ADL's. Progressing  2. The patient/caregiver will be independent with home management of lymphedema. Progressing  3. Patient/caregiver will be independent donning and doffing bilateral lower extremity compression garment. Progressing    Rehabilitation Potential For Stated Goals: Good              The information in this section was collected on 11/4/2021   (except where otherwise noted). OCCUPATIONAL PROFILE & HISTORY:   History of Present Injury/Illness (Reason for Referral):  Mr. Macy Cody was referred to OT for the evaluation and treatment of bilateral LE lymphedema. Swelling has been present for >8 years. He had therapy for lymphedema including MLD and bandaging some years ago at [facility]. He responded well to therapy but did not continue to wear compression so LE redness and swelling returned and worsened over the years. He had RTHA in July 2021 and is being seen for outpatient PT in [facility]. Swelling did not change following surgery. He reports legs are very heavy and interfere with standing, walking, perform ADL's. He has difficulty donning shoes and socks - has sock aid. He is a recent  but adult grandson lives with him and helps as needed. Mr Macy Cody presents with bilateral LE lymphedema with thick/dense fluid, hyperpigmentation, lipodermatosclerosis, skin dryness, pain. He requires skilled OT for complete decongestive therapy to reduce LE swelling before transitioning to compression garments. He has already ordered compression [place] [facility] and they will be ready Oct 7th. Past Medical History/Comorbidities:   Sleep apnea with CPAP, arthritis, cellulitis of LLE, catarcts, DM, GERD, hiatal hernia, HTN, Lymphedema, prostate cancer, varicella, venous insufficiency, RTHA 7/21  Social History/Living Environment:     pt lives in his own home with adult grandson -   Prior Level of Function/Work/Activity:  Works from home  - sits for extended periods of time at desk - very sedentary since [address] in July   Dominant Side:         RIGHT  Previous Treatment Approaches:          Therapy, compression   Current Medications:  No current outpatient medications on file.  Pt will bring list with him to next appt           Date Last Reviewed:  11/4/2021   Complexity Level : Expanded review of therapy/medical records (1-2):  MODERATE COMPLEXITY   ASSESSMENT OF OCCUPATIONAL PERFORMANCE:   Palpation:          Bilateral LE lymphedema : thick, dense fluid from foot to knee   ROM:         PT s/p RTHA  Strength:          Overall decreased - generalized debility   Skin Integrity:          Small skin wound on left anterior tibial area that was weeping but now that it is healing weeping has stopped  - pt demonstrates significant skin changes with hyperpigmentation, skin dryness, lipodermatosclerosis     Sensation:  Intact light touch   Functional Mobility:  Cane   Activities of Daily Living : modified independence - has difficulty donning shoes/socks - has sock aid and is wearing slip on shoes   Edema/Girth:  2+ and pitting   PRETREATMENT AFFECTED LIMB(s): right lower extremity  left lower extremity      Date:  9.30.21 10.14.21 10.21.21 11.2.21      Right / Left           Groin   []      []           8 inches   []      []           4 inches   []      []         PoplitealSpace   [x]      [x] 47/46 45/45 44/44 46/45       8 inches   [x]      [x] 51/52 49/47.5 45.5/46.5 55/50.5       4 inches   [x]      [x] 41.5/41 42.5/44.5 39. 5/39.5 42/44       Ankle   [x]      [x] 30.5/31 29/28.5 28/27.5 29/29.5       Instep   [x]      [x] 25/26 24/26 24/25 24/25     Measurements are taken in centimeters:  2.54 cm = 1 inch  Total:right 195cm 189.5 181 196 cm     left 196cm 191.5 182.5 194 cm           Physical Skills Involved:  1. Activity Tolerance  2. Edema  3. Skin Integrity  4. pain Cognitive Skills Affected (resulting in the inability to perform in a timely and safe manner):  1. Psychosocial Skills Affected:  1. Habits/Routines   Number of elements that affect the Plan of Care: 3-5:  MODERATE COMPLEXITY   CLINICAL DECISION MAKING:   Outcome Measure: Tool Used: Tool Used: Lymphedema Life Impact Scale   Score:  Initial: 21 Most Recent: 18 (Date: 11/2/21)   Interpretation of Score: The Lymphedema Life Impact Scale (LLIS) is a validated instrument that measures the physical, functional, and psychosocial concerns pertinent to patients with extremity lymphedema. The Scale's questionnaire is administered to patients to gauge impairments, activity limitations, and participation restrictions resulting from their lymphedema. Score 0 1-13 14-26 27-40 41-54 55-67 68   Modifier CH CI CJ CK CL CM CN     ? Other PT/OT Primary Functional Limitations:     - CURRENT STATUS: CJ - 20%-39% impaired, limited or restricted    - GOAL STATUS: CI - 1%-19% impaired, limited or restricted    - D/C STATUS:  ---------------To be determined---------------       Medical Necessity:   · Skilled intervention continues to be required due to uncontrolled swelling increasing risk of cellulitis and hindering ADL's. Reason for Services/Other Comments:  · Patient continues to require skilled intervention due to inability to self manage lymphedema at this time.   Clinical Decision-Making Assessment:     Use of outcome tool(s) and clinical judgement create a POC that gives a: Questionable prediction of patient's progress: MODERATE COMPLEXITY   TREATMENT:   (In addition to Assessment/Re-Assessment sessions the following treatments were rendered)    Pre-treatment Symptoms/Complaints: No new complaints  Pain: Initial:   Pain Intensity 1: 4  Post Session:  4   Occupational Therapy Treatments:    Therapeutic Exercise ( minutes):     HEP:  As above; handouts given to patient for all exercises. Manual Therapy:(45 minutes):  He received manual lymphatic drainage B LEs and trunk. Skin care with Eucerin followed by compression bandaging. Manual Lymph Drainage:          Lymph Nodes:    Cervical Supraclavicular Axillary Abdominal Inguinal Popliteal Antecubital   RIGHT X     X    X     -     X     X     -       LEFT X     X     X     -     X    X     -          Anastamoses:   Axillo-axillary Inguino-inguinal Axillo-inguinal Inguino-axillary   ANTERIOR -     -     -     X       POSTERIOR -     -     -     -       RIGHT -     -     -     X       LEFT -     -     -     X         Limbs:   -    RUE     -    LUE     X    RLE    X    LLE   ADL/self care: (15 minutes): Following skin care, bandaged R & L LEs with biagrip and 2 short stretch bandages  Treatment/Session Assessment:    · Response to Treatment:  Tolerated treatment without complication. Pt's circumferential measurements returned to original size; will continue therapy to achieve reduction and self management. Submitted referral to [employer] for home compression pump. · Compliance with Program/Exercises:  Improved compliance  · Recommendations/Intent for next treatment session: \"Next visit will focus on reduction and self-management of B LE lymphedema.   Total Treatment Duration:60min  OT Patient Time In/Time Out  Time In: 0800  Time Out: 0900    Hannah Rosenberg OTR/L, CLT

## 2021-11-09 ENCOUNTER — HOSPITAL ENCOUNTER (OUTPATIENT)
Dept: PHYSICAL THERAPY | Age: 76
Discharge: HOME | End: 2021-11-09
Payer: MEDICARE

## 2021-11-09 PROCEDURE — 97140 MANUAL THERAPY 1/> REGIONS: CPT

## 2021-11-09 PROCEDURE — 97535 SELF CARE MNGMENT TRAINING: CPT

## 2021-11-09 NOTE — PROGRESS NOTES
Johanna Regan  : 1945  [place]  Secondary: 310 [facility] at [address]  [address], [address]  [phone]   [phone]           OUTPATIENT OCCUPATIONAL THERAPY: Daily Note 2021    ICD-10: Treatment Diagnosis: I89.0 lymphedema not elsewhere classified, M79.89 swelling of both LE's. M79.609 pain in limbs  Precautions/Allergies:   Patient has no allergy information on record. Fall Risk Score:    Ambulatory/Rehab Services H2 Model Falls Risk Assessment    Risk Factors:       (1)  Gender [Male] Ability to Rise from Chair:       (1)  Pushes up, successful in one attempt    Falls Prevention Plan: Mobility Assistance Device (specify):  cane   Total: (5 or greater = High Risk): 2     [facility] States Patent #[id number]. Federal Law prohibits the replication, distribution or use without written permission from [facility] [employer]     MD Orders: eval and treat MEDICAL/REFERRING DIAGNOSIS:   Lymphedema, not elsewhere classified [I89.0]   DATE OF ONSET: chronic    REFERRING PHYSICIAN: Ricardo Linares MD  RETURN PHYSICIAN APPOINTMENT: to be determined      PROGRESS NOTE 21:  Mr. Alan Leija has completed 9 lymphedema treatment sessions from 21 through 21. Pt was educated on lymphatic pathophysiology, complete decongestive therapy, precautions and skin integrity management. He received manual lymphatic drainage and compression bandaging. He demonstrated reduction of 14 cm R LE and 13.5 cm L LE cumulative circumferential volumetric measurements. Today, he has had an increase in measurements with excessively dry skin and ongoing tissue issues. Mr. Phillips Began did not address skin care or wear compression [place].  He requires ongoing therapy to achieve reduction and self management with compression garments, possibly a pump for home use, and exercise. INITIAL ASSESSMENT:  Mr. Apple Weaver was referred to OT for the evaluation and treatment of bilateral LE lymphedema. Swelling has been present for >8 years. He had therapy for lymphedema including MLD and bandaging some years ago at [facility]. He responded well to therapy but did not continue to wear compression so LE redness and swelling returned and worsened over the years. He had RTHA in July 2021 and is being seen for outpatient PT in [facility]. Swelling did not change following surgery. He reports legs are very heavy and interfere with standing, walking, perform ADL's. He has difficulty donning shoes and socks - has sock aid. He is a recent  but adult grandson lives with him and helps as needed. Mr Apple Weaver presents with bilateral LE lymphedema with thick/dense fluid, hyperpigmentation, lipodermatosclerosis, skin dryness, pain. He requires skilled OT for complete decongestive therapy to reduce LE swelling before transitioning to compression garments. He has already ordered compression velcro wraps from [facility] and they will be ready Oct 7th. PLAN OF CARE:   PROBLEM LIST:  1. Increased Pain  2. Decreased Activity Tolerance  3. Edema/Girth  4. Decreased Knowledge of Precautions  5. Decreased Skin Integrity/Hygeine INTERVENTIONS PLANNED  1. Skin care  2. Compression bandaging  3. Fitting for compression garment(s)  4. Manual therapy/Manual lymph drainage  5. Therapeutic exercise/Therapeutic activities  6. Patient Education  7. Compression pump trial prn  8.  kinesiotaping    TREATMENT PLAN:  Effective Dates: 9/30/21 to 12/29/2021 Frequency/Duration: 2x/week up to 90 days  2 xweek x90 days  and upon reassessment. Will adjust frequency and duration as progress indicates. GOALS: (Goals have been discussed and agreed upon with patient.)  Short-Term Functional Goals: Time Frame: 45 days  1.  The patient/caregiver will verbalize understanding of lymphedema precautions. Goal met  2. Patient will be independent with skin care regimen to decrease risk of cellulitis. Goal met  3. The patient/caregiver will be independent with self-manual lymph drainage techniques and show decrease in limb volume. Progressing  4. Patient will be independent in lymphatic exercises. Progressing  Discharge Goals: Time Frame: 90 days  1. Patient's bilateral lower extremity circumferential measurements will decrease on volumetric graph by 5-7cm to maximize functional use in ADL's. Progressing  2. The patient/caregiver will be independent with home management of lymphedema. Progressing  3. Patient/caregiver will be independent donning and doffing bilateral lower extremity compression garment. Progressing    Rehabilitation Potential For Stated Goals: Good              The information in this section was collected on 11/9/2021   (except where otherwise noted). OCCUPATIONAL PROFILE & HISTORY:   History of Present Injury/Illness (Reason for Referral):  Mr. Anupam Delgado was referred to OT for the evaluation and treatment of bilateral LE lymphedema. Swelling has been present for >8 years. He had therapy for lymphedema including MLD and bandaging some years ago at [email or web address]. He responded well to therapy but did not continue to wear compression so LE redness and swelling returned and worsened over the years. He had RTHA in July 2021 and is being seen for outpatient PT in [facility]. Swelling did not change following surgery. He reports legs are very heavy and interfere with standing, walking, perform ADL's. He has difficulty donning shoes and socks - has sock aid. He is a recent  but adult grandson lives with him and helps as needed. Mr Anupam Delgado presents with bilateral LE lymphedema with thick/dense fluid, hyperpigmentation, lipodermatosclerosis, skin dryness, pain. He requires skilled OT for complete decongestive therapy to reduce LE swelling before transitioning to compression garments. He has already ordered compression velcro wraps from [facility] and they will be ready Oct 7th. Past Medical History/Comorbidities:   Sleep apnea with CPAP, arthritis, cellulitis of LLE, catarcts, DM, GERD, hiatal hernia, HTN, Lymphedema, prostate cancer, varicella, venous insufficiency, RTHA 7/21  Social History/Living Environment:     pt lives in his own home with adult grandson -   Prior Level of Function/Work/Activity:  Works from home  - sits for extended periods of time at desk - very sedentary since [address] in July   Dominant Side:         RIGHT  Previous Treatment Approaches:          Therapy, compression   Current Medications:  No current outpatient medications on file.  Pt will bring list with him to next appt           Date Last Reviewed:  11/9/2021   Complexity Level : Expanded review of therapy/medical records (1-2):  MODERATE COMPLEXITY   ASSESSMENT OF OCCUPATIONAL PERFORMANCE:   Palpation:          Bilateral LE lymphedema : thick, dense fluid from foot to knee   ROM:         PT s/p RTHA  Strength:          Overall decreased - generalized debility   Skin Integrity:          Small skin wound on left anterior tibial area that was weeping but now that it is healing weeping has stopped  - pt demonstrates significant skin changes with hyperpigmentation, skin dryness, lipodermatosclerosis     Sensation:  Intact light touch   Functional Mobility:  Cane   Activities of Daily Living : modified independence - has difficulty donning shoes/socks - has sock aid and is wearing slip on shoes   Edema/Girth:  2+ and pitting   PRETREATMENT AFFECTED LIMB(s): right lower extremity  left lower extremity      Date:  9.30.21 10.14.21 10.21.21 11.2.21      Right / Left           Groin   []      []           8 inches   []      []           4 inches   []      []         PoplitealSpace   [x]      [x] 47/46 45/45 44/44 46/45       8 inches   [x]      [x] 51/52 49/47.5 45.5/46.5 55/50.5       4 inches   [x]      [x] 41.5/41 42.5/44.5 39. 5/39.5 42/44       Ankle   [x]      [x] 30.5/31 29/28.5 28/27.5 29/29.5       Instep   [x]      [x] 25/26 24/26 24/25 24/25     Measurements are taken in centimeters:  2.54 cm = 1 inch  Total:right 195cm 189.5 181 196 cm     left 196cm 191.5 182.5 194 cm           Physical Skills Involved:  1. Activity Tolerance  2. Edema  3. Skin Integrity  4. pain Cognitive Skills Affected (resulting in the inability to perform in a timely and safe manner):  1. Psychosocial Skills Affected:  1. Habits/Routines   Number of elements that affect the Plan of Care: 3-5:  MODERATE COMPLEXITY   CLINICAL DECISION MAKING:   Outcome Measure: Tool Used: Tool Used: Lymphedema Life Impact Scale   Score:  Initial: 21 Most Recent: 18 (Date: 11/2/21)   Interpretation of Score: The Lymphedema Life Impact Scale (LLIS) is a validated instrument that measures the physical, functional, and psychosocial concerns pertinent to patients with extremity lymphedema. The Scale's questionnaire is administered to patients to gauge impairments, activity limitations, and participation restrictions resulting from their lymphedema. Score 0 1-13 14-26 27-40 41-54 55-67 68   Modifier CH CI CJ CK CL CM CN     ? Other PT/OT Primary Functional Limitations:     - CURRENT STATUS: CJ - 20%-39% impaired, limited or restricted    - GOAL STATUS: CI - 1%-19% impaired, limited or restricted    - D/C STATUS:  ---------------To be determined---------------       Medical Necessity:   · Skilled intervention continues to be required due to uncontrolled swelling increasing risk of cellulitis and hindering ADL's. Reason for Services/Other Comments:  · Patient continues to require skilled intervention due to inability to self manage lymphedema at this time.   Clinical Decision-Making Assessment:     Use of outcome tool(s) and clinical judgement create a POC that gives a: Questionable prediction of patient's progress: MODERATE COMPLEXITY   TREATMENT:   (In addition to Assessment/Re-Assessment sessions the following treatments were rendered)    Pre-treatment Symptoms/Complaints: Pt wore bandages until Saturday, then alternated wearing CircAid wrap on each leg. Pain: Initial:   Pain Intensity 1: 4  Post Session:  4   Occupational Therapy Treatments:    Therapeutic Exercise ( minutes):     HEP:  As above; handouts given to patient for all exercises. Manual Therapy:(45 minutes):  He received manual lymphatic drainage B LEs and trunk. Skin care with Eucerin followed by compression bandaging. Manual Lymph Drainage:          Lymph Nodes:    Cervical Supraclavicular Axillary Abdominal Inguinal Popliteal Antecubital   RIGHT X     X    X     -     X     X     -       LEFT X     X     X     -     X    X     -          Anastamoses:   Axillo-axillary Inguino-inguinal Axillo-inguinal Inguino-axillary   ANTERIOR -     -     -     X       POSTERIOR -     -     -     -       RIGHT -     -     -     X       LEFT -     -     -     X         Limbs:   -    RUE     -    LUE     X    RLE    X    LLE   ADL/self care: (15 minutes): Following skin care, bandaged R & L LEs with biagrip and 2 short stretch bandages  Treatment/Session Assessment:    · Response to Treatment:  Tolerated treatment without complication. Pt's circumferential measurements returned to original size; will continue therapy to achieve reduction and self management. Submitted referral to [employer] for home compression pump. Pt states that pump company has contacted him  · Compliance with Program/Exercises:  Improved compliance  · Recommendations/Intent for next treatment session: \"Next visit will focus on reduction and self-management of B LE lymphedema.   Total Treatment Duration:60min  OT Patient Time In/Time Out  Time In: 0800  Time Out: 0900    DEAN Black/L, NATASHA

## 2021-11-11 ENCOUNTER — HOSPITAL ENCOUNTER (OUTPATIENT)
Dept: PHYSICAL THERAPY | Age: 76
Discharge: HOME | End: 2021-11-11
Payer: MEDICARE

## 2021-11-11 PROCEDURE — 97535 SELF CARE MNGMENT TRAINING: CPT

## 2021-11-11 PROCEDURE — 97140 MANUAL THERAPY 1/> REGIONS: CPT

## 2021-11-11 NOTE — PROGRESS NOTES
Renetta Richardson  : 1945  [place]  Secondary: 310 [facility] at [address]  [address], [address]  [phone]   [phone]           OUTPATIENT OCCUPATIONAL THERAPY: Daily Note 2021    ICD-10: Treatment Diagnosis: I89.0 lymphedema not elsewhere classified, M79.89 swelling of both LE's. M79.609 pain in limbs  Precautions/Allergies:   Patient has no allergy information on record. Fall Risk Score:    Ambulatory/Rehab Services H2 Model Falls Risk Assessment    Risk Factors:       (1)  Gender [Male] Ability to Rise from Chair:       (1)  Pushes up, successful in one attempt    Falls Prevention Plan: Mobility Assistance Device (specify):  cane   Total: (5 or greater = High Risk): 2     [facility] States Patent #[id number]. Federal Law prohibits the replication, distribution or use without written permission from [facility]     MD Orders: eval and treat MEDICAL/REFERRING DIAGNOSIS:   Lymphedema, not elsewhere classified [I89.0]   DATE OF ONSET: chronic    REFERRING PHYSICIAN: Pam Amaya MD  RETURN PHYSICIAN APPOINTMENT: to be determined      PROGRESS NOTE 21:  Mr. Aneudy Hikcs has completed 9 lymphedema treatment sessions from 21 through 21. Pt was educated on lymphatic pathophysiology, complete decongestive therapy, precautions and skin integrity management. He received manual lymphatic drainage and compression bandaging. He demonstrated reduction of 14 cm R LE and 13.5 cm L LE cumulative circumferential volumetric measurements. Today, he has had an increase in measurements with excessively dry skin and ongoing tissue issues. Mr. Aneudy Hicks did not address skin care or wear compression [place].  He requires ongoing therapy to achieve reduction and self management with compression garments, possibly a pump for home use, and exercise. INITIAL ASSESSMENT:  Mr. Briseida Mayo was referred to OT for the evaluation and treatment of bilateral LE lymphedema. Swelling has been present for >8 years. He had therapy for lymphedema including MLD and bandaging some years ago at [facility]. He responded well to therapy but did not continue to wear compression so LE redness and swelling returned and worsened over the years. He had RTHA in July 2021 and is being seen for outpatient PT in [facility]. Swelling did not change following surgery. He reports legs are very heavy and interfere with standing, walking, perform ADL's. He has difficulty donning shoes and socks - has sock aid. He is a recent  but adult grandson lives with him and helps as needed. Mr Briseida Mayo presents with bilateral LE lymphedema with thick/dense fluid, hyperpigmentation, lipodermatosclerosis, skin dryness, pain. He requires skilled OT for complete decongestive therapy to reduce LE swelling before transitioning to compression garments. He has already ordered compression velcro wraps from [facility] and they will be ready Oct 7th. PLAN OF CARE:   PROBLEM LIST:  1. Increased Pain  2. Decreased Activity Tolerance  3. Edema/Girth  4. Decreased Knowledge of Precautions  5. Decreased Skin Integrity/Hygeine INTERVENTIONS PLANNED  1. Skin care  2. Compression bandaging  3. Fitting for compression garment(s)  4. Manual therapy/Manual lymph drainage  5. Therapeutic exercise/Therapeutic activities  6. Patient Education  7. Compression pump trial prn  8.  kinesiotaping    TREATMENT PLAN:  Effective Dates: 9/30/21 to 12/29/2021 Frequency/Duration: 2x/week up to 90 days  2 xweek x90 days  and upon reassessment. Will adjust frequency and duration as progress indicates. GOALS: (Goals have been discussed and agreed upon with patient.)  Short-Term Functional Goals: Time Frame: 45 days  1.  The patient/caregiver will verbalize understanding of lymphedema precautions. Goal met  2. Patient will be independent with skin care regimen to decrease risk of cellulitis. Goal met  3. The patient/caregiver will be independent with self-manual lymph drainage techniques and show decrease in limb volume. Progressing  4. Patient will be independent in lymphatic exercises. Progressing  Discharge Goals: Time Frame: 90 days  1. Patient's bilateral lower extremity circumferential measurements will decrease on volumetric graph by 5-7cm to maximize functional use in ADL's. Progressing  2. The patient/caregiver will be independent with home management of lymphedema. Progressing  3. Patient/caregiver will be independent donning and doffing bilateral lower extremity compression garment. Progressing    Rehabilitation Potential For Stated Goals: Good              The information in this section was collected on 11/11/2021   (except where otherwise noted). OCCUPATIONAL PROFILE & HISTORY:   History of Present Injury/Illness (Reason for Referral):  Mr. Delaney Lainez was referred to OT for the evaluation and treatment of bilateral LE lymphedema. Swelling has been present for >8 years. He had therapy for lymphedema including MLD and bandaging some years ago at "[facility]. He responded well to therapy but did not continue to wear compression so LE redness and swelling returned and worsened over the years. He had RTHA in July 2021 and is being seen for outpatient PT in [facility]. Swelling did not change following surgery. He reports legs are very heavy and interfere with standing, walking, perform ADL's. He has difficulty donning shoes and socks - has sock aid. He is a recent  but adult grandson lives with him and helps as needed. Mr Delaney Lainez presents with bilateral LE lymphedema with thick/dense fluid, hyperpigmentation, lipodermatosclerosis, skin dryness, pain.  He requires skilled OT for complete decongestive therapy to reduce LE swelling before transitioning to compression garments. He has already ordered compression velcro wraps from [facility] and they will be ready Oct 7th. Past Medical History/Comorbidities:   Sleep apnea with CPAP, arthritis, cellulitis of LLE, catarcts, DM, GERD, hiatal hernia, HTN, Lymphedema, prostate cancer, varicella, venous insufficiency, RTHA 7/21  Social History/Living Environment:     pt lives in his own home with adult grandson -   Prior Level of Function/Work/Activity:  Works from home  - sits for extended periods of time at desk - very sedentary since [address] in July   Dominant Side:         RIGHT  Previous Treatment Approaches:          Therapy, compression   Current Medications:  No current outpatient medications on file.  Pt will bring list with him to next appt           Date Last Reviewed:  11/11/2021   Complexity Level : Expanded review of therapy/medical records (1-2):  MODERATE COMPLEXITY   ASSESSMENT OF OCCUPATIONAL PERFORMANCE:   Palpation:          Bilateral LE lymphedema : thick, dense fluid from foot to knee   ROM:         PT s/p RTHA  Strength:          Overall decreased - generalized debility   Skin Integrity:          Small skin wound on left anterior tibial area that was weeping but now that it is healing weeping has stopped  - pt demonstrates significant skin changes with hyperpigmentation, skin dryness, lipodermatosclerosis     Sensation:  Intact light touch   Functional Mobility:  Cane   Activities of Daily Living : modified independence - has difficulty donning shoes/socks - has sock aid and is wearing slip on shoes   Edema/Girth:  2+ and pitting   PRETREATMENT AFFECTED LIMB(s): right lower extremity  left lower extremity      Date:  9.30.21 10.14.21 10.21.21 11.2.21      Right / Left           Groin   []      []           8 inches   []      []           4 inches   []      []         PoplitealSpace   [x]      [x] 47/46 45/45 44/44 46/45       8 inches   [x]      [x] 51/52 49/47.5 45.5/46.5 55/50.5       4 inches   [x]      [x] 41.5/41 42. 5/44.5 39.5/39.5 42/44       Ankle   [x]      [x] 30.5/31 29/28.5 28/27.5 29/29.5       Instep   [x]      [x] 25/26 24/26 24/25 24/25     Measurements are taken in centimeters:  2.54 cm = 1 inch  Total:right 195cm 189.5 181 196 cm     left 196cm 191.5 182.5 194 cm           Physical Skills Involved:  1. Activity Tolerance  2. Edema  3. Skin Integrity  4. pain Cognitive Skills Affected (resulting in the inability to perform in a timely and safe manner):  1. Psychosocial Skills Affected:  1. Habits/Routines   Number of elements that affect the Plan of Care: 3-5:  MODERATE COMPLEXITY   CLINICAL DECISION MAKING:   Outcome Measure: Tool Used: Tool Used: Lymphedema Life Impact Scale   Score:  Initial: 21 Most Recent: 18 (Date: 11/2/21)   Interpretation of Score: The Lymphedema Life Impact Scale (LLIS) is a validated instrument that measures the physical, functional, and psychosocial concerns pertinent to patients with extremity lymphedema. The Scale's questionnaire is administered to patients to gauge impairments, activity limitations, and participation restrictions resulting from their lymphedema. Score 0 1-13 14-26 27-40 41-54 55-67 68   Modifier CH CI CJ CK CL CM CN     ? Other PT/OT Primary Functional Limitations:     - CURRENT STATUS: CJ - 20%-39% impaired, limited or restricted    - GOAL STATUS: CI - 1%-19% impaired, limited or restricted    - D/C STATUS:  ---------------To be determined---------------       Medical Necessity:   · Skilled intervention continues to be required due to uncontrolled swelling increasing risk of cellulitis and hindering ADL's. Reason for Services/Other Comments:  · Patient continues to require skilled intervention due to inability to self manage lymphedema at this time.   Clinical Decision-Making Assessment:     Use of outcome tool(s) and clinical judgement create a POC that gives a: Questionable prediction of patient's progress: MODERATE COMPLEXITY   TREATMENT: (In addition to Assessment/Re-Assessment sessions the following treatments were rendered)    Pre-treatment Symptoms/Complaints: \"my ankles are smaller today\"  Pain: Initial:   Pain Intensity 1: 4  Post Session:  4   Occupational Therapy Treatments:    Therapeutic Exercise ( minutes):     HEP:  As above; handouts given to patient for all exercises. Manual Therapy:(45 minutes):  He received manual lymphatic drainage B LEs and trunk. Skin care with Eucerin followed by compression bandaging. Manual Lymph Drainage:          Lymph Nodes:    Cervical Supraclavicular Axillary Abdominal Inguinal Popliteal Antecubital   RIGHT X     X    X     -     X     X     -       LEFT X     X     X     -     X    X     -          Anastamoses:   Axillo-axillary Inguino-inguinal Axillo-inguinal Inguino-axillary   ANTERIOR -     -     -     X       POSTERIOR -     -     -     -       RIGHT -     -     -     X       LEFT -     -     -     X         Limbs:   -    RUE     -    LUE     X    RLE    X    LLE   ADL/self care: (15 minutes): Following skin care, bandaged R & L LEs with biagrip and 2 short stretch bandages  Treatment/Session Assessment:    · Response to Treatment:  Tolerated treatment without complication. · Compliance with Program/Exercises:  Improved compliance  · Recommendations/Intent for next treatment session: \"Next visit will focus on reduction and self-management of B LE lymphedema.   Total Treatment Duration:60min  OT Patient Time In/Time Out  Time In: 0800  Time Out: 0900    DEAN Reynolds/LIA, CLT

## 2021-11-16 ENCOUNTER — HOSPITAL ENCOUNTER (OUTPATIENT)
Dept: PHYSICAL THERAPY | Age: 76
Discharge: HOME | End: 2021-11-16
Payer: MEDICARE

## 2021-11-16 PROCEDURE — 97140 MANUAL THERAPY 1/> REGIONS: CPT

## 2021-11-16 PROCEDURE — 97535 SELF CARE MNGMENT TRAINING: CPT

## 2021-11-16 NOTE — PROGRESS NOTES
Aneudy Brown  : 1945  [place]  Secondary: 310 [facility] at [address]  [address], [address]  [phone]   [phone]           OUTPATIENT OCCUPATIONAL THERAPY: Daily Note 2021    ICD-10: Treatment Diagnosis: I89.0 lymphedema not elsewhere classified, M79.89 swelling of both LE's. M79.609 pain in limbs  Precautions/Allergies:   Patient has no allergy information on record. Fall Risk Score:    Ambulatory/Rehab Services H2 Model Falls Risk Assessment    Risk Factors:       (1)  Gender [Male] Ability to Rise from Chair:       (1)  Pushes up, successful in one attempt    Falls Prevention Plan: Mobility Assistance Device (specify):  cane   Total: (5 or greater = High Risk): 2     [facility] States Patent #[id number]. Federal Law prohibits the replication, distribution or use without written permission from [facility] [employer]     MD Orders: eval and treat MEDICAL/REFERRING DIAGNOSIS:   Lymphedema, not elsewhere classified [I89.0]   DATE OF ONSET: chronic    REFERRING PHYSICIAN: Beto Sutton MD  RETURN PHYSICIAN APPOINTMENT: to be determined      PROGRESS NOTE 21:  Mr. Daniel Sapp has completed 9 lymphedema treatment sessions from 21 through 21. Pt was educated on lymphatic pathophysiology, complete decongestive therapy, precautions and skin integrity management. He received manual lymphatic drainage and compression bandaging. He demonstrated reduction of 14 cm R LE and 13.5 cm L LE cumulative circumferential volumetric measurements. Today, he has had an increase in measurements with excessively dry skin and ongoing tissue issues. Mr. Daniel Sapp did not address skin care or wear compression while on vacation in the [facility] for 4 days.  He requires ongoing therapy to achieve reduction and self management with compression garments, possibly a pump for home use, and exercise. INITIAL ASSESSMENT:  Mr. Mihaela Farfan was referred to OT for the evaluation and treatment of bilateral LE lymphedema. Swelling has been present for >8 years. He had therapy for lymphedema including MLD and bandaging some years ago at [facility]. He responded well to therapy but did not continue to wear compression so LE redness and swelling returned and worsened over the years. He had RTHA in July 2021 and is being seen for outpatient PT in [facility]. Swelling did not change following surgery. He reports legs are very heavy and interfere with standing, walking, perform ADL's. He has difficulty donning shoes and socks - has sock aid. He is a recent  but adult grandson lives with him and helps as needed. Mr Mihaela Farfan presents with bilateral LE lymphedema with thick/dense fluid, hyperpigmentation, lipodermatosclerosis, skin dryness, pain. He requires skilled OT for complete decongestive therapy to reduce LE swelling before transitioning to compression garments. He has already ordered compression velcro wraps from [facility] and they will be ready Oct 7th. PLAN OF CARE:   PROBLEM LIST:  1. Increased Pain  2. Decreased Activity Tolerance  3. Edema/Girth  4. Decreased Knowledge of Precautions  5. Decreased Skin Integrity/Hygeine INTERVENTIONS PLANNED  1. Skin care  2. Compression bandaging  3. Fitting for compression garment(s)  4. Manual therapy/Manual lymph drainage  5. Therapeutic exercise/Therapeutic activities  6. Patient Education  7. Compression pump trial prn  8.  kinesiotaping    TREATMENT PLAN:  Effective Dates: 9/30/21 to 12/29/2021 Frequency/Duration: 2x/week up to 90 days  2 xweek x90 days  and upon reassessment. Will adjust frequency and duration as progress indicates. GOALS: (Goals have been discussed and agreed upon with patient.)  Short-Term Functional Goals: Time Frame: 45 days  1.  The patient/caregiver will verbalize understanding of lymphedema precautions. Goal met  2. Patient will be independent with skin care regimen to decrease risk of cellulitis. Goal met  3. The patient/caregiver will be independent with self-manual lymph drainage techniques and show decrease in limb volume. Progressing  4. Patient will be independent in lymphatic exercises. Progressing  Discharge Goals: Time Frame: 90 days  1. Patient's bilateral lower extremity circumferential measurements will decrease on volumetric graph by 5-7cm to maximize functional use in ADL's. Progressing  2. The patient/caregiver will be independent with home management of lymphedema. Progressing  3. Patient/caregiver will be independent donning and doffing bilateral lower extremity compression garment. Progressing    Rehabilitation Potential For Stated Goals: Good              The information in this section was collected on 11/16/2021   (except where otherwise noted). OCCUPATIONAL PROFILE & HISTORY:   History of Present Injury/Illness (Reason for Referral):  Mr. Alan Leija was referred to OT for the evaluation and treatment of bilateral LE lymphedema. Swelling has been present for >8 years. He had therapy for lymphedema including MLD and bandaging some years ago at [facility]. He responded well to therapy but did not continue to wear compression so LE redness and swelling returned and worsened over the years. He had RTHA in July 2021 and is being seen for outpatient PT in [facility]. Swelling did not change following surgery. He reports legs are very heavy and interfere with standing, walking, perform ADL's. He has difficulty donning shoes and socks - has sock aid. He is a recent  but adult grandson lives with him and helps as needed. Mr Alan Leija presents with bilateral LE lymphedema with thick/dense fluid, hyperpigmentation, lipodermatosclerosis, skin dryness, pain.  He requires skilled OT for complete decongestive therapy to reduce LE swelling before transitioning to compression garments. He has already ordered compression velcro wraps from [facility] and they will be ready Oct 7th. Past Medical History/Comorbidities:   Sleep apnea with CPAP, arthritis, cellulitis of LLE, catarcts, DM, GERD, hiatal hernia, HTN, Lymphedema, prostate cancer, varicella, venous insufficiency, RTHA 7/21  Social History/Living Environment:     pt lives in his own home with adult grandson -   Prior Level of Function/Work/Activity:  Works from home  - sits for extended periods of time at desk - very sedentary since [address] in July   Dominant Side:         RIGHT  Previous Treatment Approaches:          Therapy, compression   Current Medications:  No current outpatient medications on file.  Pt will bring list with him to next appt           Date Last Reviewed:  11/16/2021   Complexity Level : Expanded review of therapy/medical records (1-2):  MODERATE COMPLEXITY   ASSESSMENT OF OCCUPATIONAL PERFORMANCE:   Palpation:          Bilateral LE lymphedema : thick, dense fluid from foot to knee   ROM:         PT s/p RTHA  Strength:          Overall decreased - generalized debility   Skin Integrity:          Small skin wound on left anterior tibial area that was weeping but now that it is healing weeping has stopped  - pt demonstrates significant skin changes with hyperpigmentation, skin dryness, lipodermatosclerosis     Sensation:  Intact light touch   Functional Mobility:  Cane   Activities of Daily Living : modified independence - has difficulty donning shoes/socks - has sock aid and is wearing slip on shoes   Edema/Girth:  2+ and pitting   PRETREATMENT AFFECTED LIMB(s): right lower extremity  left lower extremity      Date:  9.30.21 10.14.21 10.21.21 11.2.21 11.16.21     Right / Left           Groin   []      []           8 inches   []      []           4 inches   []      []         PoplitealSpace   [x]      [x] 47/46 45/45 44/44 46/45 45.5/45      8 inches   [x]      [x] 51/52 49/47.5 45.5/46.5 55/50.5 52/51      4 inches [x]      [x] 41.5/41 42.5/44.5 39.5/39.5 42/44 42/42      Ankle   [x]      [x] 30.5/31 29/28.5 28/27.5 29/29.5 30/31      Instep   [x]      [x] 25/26 24/26 24/25 24/25 24/25.5    Measurements are taken in centimeters:  2.54 cm = 1 inch  Total:right 195cm 189.5 181 196 cm 193.5    left 196cm 191.5 182.5 194 cm 194.5          Physical Skills Involved:  1. Activity Tolerance  2. Edema  3. Skin Integrity  4. pain Cognitive Skills Affected (resulting in the inability to perform in a timely and safe manner):  1. Psychosocial Skills Affected:  1. Habits/Routines   Number of elements that affect the Plan of Care: 3-5:  MODERATE COMPLEXITY   CLINICAL DECISION MAKING:   Outcome Measure: Tool Used: Tool Used: Lymphedema Life Impact Scale   Score:  Initial: 21 Most Recent: 18 (Date: 11/2/21)   Interpretation of Score: The Lymphedema Life Impact Scale (LLIS) is a validated instrument that measures the physical, functional, and psychosocial concerns pertinent to patients with extremity lymphedema. The Scale's questionnaire is administered to patients to gauge impairments, activity limitations, and participation restrictions resulting from their lymphedema. Score 0 1-13 14-26 27-40 41-54 55-67 68   Modifier CH CI CJ CK CL CM CN     ? Other PT/OT Primary Functional Limitations:     - CURRENT STATUS: CJ - 20%-39% impaired, limited or restricted    - GOAL STATUS: CI - 1%-19% impaired, limited or restricted    - D/C STATUS:  ---------------To be determined---------------       Medical Necessity:   · Skilled intervention continues to be required due to uncontrolled swelling increasing risk of cellulitis and hindering ADL's. Reason for Services/Other Comments:  · Patient continues to require skilled intervention due to inability to self manage lymphedema at this time.   Clinical Decision-Making Assessment:     Use of outcome tool(s) and clinical judgement create a POC that gives a: Questionable prediction of patient's progress: MODERATE COMPLEXITY   TREATMENT:   (In addition to Assessment/Re-Assessment sessions the following treatments were rendered)    Pre-treatment Symptoms/Complaints: \"I haven't been wearing any compression since Saturday\"  Pain: Initial:   Pain Intensity 1: 4  Post Session:  4   Occupational Therapy Treatments:    Therapeutic Exercise ( minutes):     HEP:  As above; handouts given to patient for all exercises. Manual Therapy:(45 minutes):  He received manual lymphatic drainage B LEs and trunk. Skin care with Eucerin followed by compression bandaging. Measured with reduction of 1.5 cm R LE and 1.5 cm L LE circumferential measurements. Manual Lymph Drainage:          Lymph Nodes:    Cervical Supraclavicular Axillary Abdominal Inguinal Popliteal Antecubital   RIGHT X     X    X     -     X     X     -       LEFT X     X     X     -     X    X     -          Anastamoses:   Axillo-axillary Inguino-inguinal Axillo-inguinal Inguino-axillary   ANTERIOR -     -     -     X       POSTERIOR -     -     -     -       RIGHT -     -     -     X       LEFT -     -     -     X         Limbs:   -    RUE     -    LUE     X    RLE    X    LLE   ADL/self care: (15 minutes): Following skin care, bandaged R & L LEs with biagrip and 2 short stretch bandages  Treatment/Session Assessment:    · Response to Treatment:  Tolerated treatment without complication. · Compliance with Program/Exercises: Poor  · Recommendations/Intent for next treatment session: \"Next visit will focus on reduction and self-management of B LE lymphedema.   Total Treatment Duration:60min  OT Patient Time In/Time Out  Time In: 1100  Time Out: DEAN Vasquez/LIA, NATASHA

## 2021-11-18 ENCOUNTER — HOSPITAL ENCOUNTER (OUTPATIENT)
Dept: PHYSICAL THERAPY | Age: 76
Discharge: HOME | End: 2021-11-18
Payer: MEDICARE

## 2021-11-18 PROCEDURE — 97535 SELF CARE MNGMENT TRAINING: CPT

## 2021-11-18 PROCEDURE — 97140 MANUAL THERAPY 1/> REGIONS: CPT

## 2021-11-18 NOTE — PROGRESS NOTES
Clau Meier  : 1945  [place]  Secondary: 310 [facility] at [address]  [address], [address]  [phone]   [phone]           OUTPATIENT OCCUPATIONAL THERAPY: Daily Note 2021    ICD-10: Treatment Diagnosis: I89.0 lymphedema not elsewhere classified, M79.89 swelling of both LE's. M79.609 pain in limbs  Precautions/Allergies:   Patient has no allergy information on record. Fall Risk Score:    Ambulatory/Rehab Services H2 Model Falls Risk Assessment    Risk Factors:       (1)  Gender [Male] Ability to Rise from Chair:       (1)  Pushes up, successful in one attempt    Falls Prevention Plan: Mobility Assistance Device (specify):  cane   Total: (5 or greater = High Risk): 2     [facility] of [address] States Patent #[id number]. Federal Law prohibits the replication, distribution or use without written permission from [facility] [email or web address]     MD Orders: eval and treat MEDICAL/REFERRING DIAGNOSIS:   Lymphedema, not elsewhere classified [I89.0]   DATE OF ONSET: chronic    REFERRING PHYSICIAN: Charlee Blake MD  RETURN PHYSICIAN APPOINTMENT: to be determined      PROGRESS NOTE 21:  Mr. Yeimy Watson has completed 9 lymphedema treatment sessions from 21 through 21. Pt was educated on lymphatic pathophysiology, complete decongestive therapy, precautions and skin integrity management. He received manual lymphatic drainage and compression bandaging. He demonstrated reduction of 14 cm R LE and 13.5 cm L LE cumulative circumferential volumetric measurements. Today, he has had an increase in measurements with excessively dry skin and ongoing tissue issues. Mr. Yeimy Watson did not address skin care or wear compression while on vacation in the [facility] for 4 days.  He requires ongoing therapy to achieve reduction and self management with compression garments, possibly a pump for home use, and exercise. INITIAL ASSESSMENT:  Mr. Anupam Delgado was referred to OT for the evaluation and treatment of bilateral LE lymphedema. Swelling has been present for >8 years. He had therapy for lymphedema including MLD and bandaging some years ago at [facility]. He responded well to therapy but did not continue to wear compression so LE redness and swelling returned and worsened over the years. He had RTHA in July 2021 and is being seen for outpatient PT in [facility]. Swelling did not change following surgery. He reports legs are very heavy and interfere with standing, walking, perform ADL's. He has difficulty donning shoes and socks - has sock aid. He is a recent  but adult grandson lives with him and helps as needed. Mr Anupam Delgado presents with bilateral LE lymphedema with thick/dense fluid, hyperpigmentation, lipodermatosclerosis, skin dryness, pain. He requires skilled OT for complete decongestive therapy to reduce LE swelling before transitioning to compression garments. He has already ordered compression velcro wraps from [facility] and they will be ready Oct 7th. PLAN OF CARE:   PROBLEM LIST:  1. Increased Pain  2. Decreased Activity Tolerance  3. Edema/Girth  4. Decreased Knowledge of Precautions  5. Decreased Skin Integrity/Hygeine INTERVENTIONS PLANNED  1. Skin care  2. Compression bandaging  3. Fitting for compression garment(s)  4. Manual therapy/Manual lymph drainage  5. Therapeutic exercise/Therapeutic activities  6. Patient Education  7. Compression pump trial prn  8.  kinesiotaping    TREATMENT PLAN:  Effective Dates: 9/30/21 to 12/29/2021 Frequency/Duration: 2x/week up to 90 days  2 xweek x90 days  and upon reassessment. Will adjust frequency and duration as progress indicates. GOALS: (Goals have been discussed and agreed upon with patient.)  Short-Term Functional Goals: Time Frame: 45 days  1.  The patient/caregiver will verbalize understanding of lymphedema precautions. Goal met  2. Patient will be independent with skin care regimen to decrease risk of cellulitis. Goal met  3. The patient/caregiver will be independent with self-manual lymph drainage techniques and show decrease in limb volume. Progressing  4. Patient will be independent in lymphatic exercises. Progressing  Discharge Goals: Time Frame: 90 days  1. Patient's bilateral lower extremity circumferential measurements will decrease on volumetric graph by 5-7cm to maximize functional use in ADL's. Progressing  2. The patient/caregiver will be independent with home management of lymphedema. Progressing  3. Patient/caregiver will be independent donning and doffing bilateral lower extremity compression garment. Progressing    Rehabilitation Potential For Stated Goals: Good              The information in this section was collected on 11/18/2021   (except where otherwise noted). OCCUPATIONAL PROFILE & HISTORY:   History of Present Injury/Illness (Reason for Referral):  Mr. Juan Forde was referred to OT for the evaluation and treatment of bilateral LE lymphedema. Swelling has been present for >8 years. He had therapy for lymphedema including MLD and bandaging some years ago at [facility]. He responded well to therapy but did not continue to wear compression so LE redness and swelling returned and worsened over the years. He had RTHA in July 2021 and is being seen for outpatient PT in [facility]. Swelling did not change following surgery. He reports legs are very heavy and interfere with standing, walking, perform ADL's. He has difficulty donning shoes and socks - has sock aid. He is a recent  but adult grandson lives with him and helps as needed. Mr Juan Forde presents with bilateral LE lymphedema with thick/dense fluid, hyperpigmentation, lipodermatosclerosis, skin dryness, pain.  He requires skilled OT for complete decongestive therapy to reduce LE swelling before transitioning to compression garments. He has already ordered compression velcro wraps from [facility] and they will be ready Oct 7th. Past Medical History/Comorbidities:   Sleep apnea with CPAP, arthritis, cellulitis of LLE, catarcts, DM, GERD, hiatal hernia, HTN, Lymphedema, prostate cancer, varicella, venous insufficiency, RTHA 7/21  Social History/Living Environment:     pt lives in his own home with adult grandson -   Prior Level of Function/Work/Activity:  Works from home  - sits for extended periods of time at desk - very sedentary since [address] in July   Dominant Side:         RIGHT  Previous Treatment Approaches:          Therapy, compression   Current Medications:  No current outpatient medications on file.  Pt will bring list with him to next appt           Date Last Reviewed:  11/18/2021   Complexity Level : Expanded review of therapy/medical records (1-2):  MODERATE COMPLEXITY   ASSESSMENT OF OCCUPATIONAL PERFORMANCE:   Palpation:          Bilateral LE lymphedema : thick, dense fluid from foot to knee   ROM:         PT s/p RTHA  Strength:          Overall decreased - generalized debility   Skin Integrity:          Small skin wound on left anterior tibial area that was weeping but now that it is healing weeping has stopped  - pt demonstrates significant skin changes with hyperpigmentation, skin dryness, lipodermatosclerosis     Sensation:  Intact light touch   Functional Mobility:  Cane   Activities of Daily Living : modified independence - has difficulty donning shoes/socks - has sock aid and is wearing slip on shoes   Edema/Girth:  2+ and pitting   PRETREATMENT AFFECTED LIMB(s): right lower extremity  left lower extremity      Date:  9.30.21 10.14.21 10.21.21 11.2.21 11.16.21     Right / Left           Groin   []      []           8 inches   []      []           4 inches   []      []         PoplitealSpace   [x]      [x] 47/46 45/45 44/44 46/45 45.5/45      8 inches   [x]      [x] 51/52 49/47.5 45.5/46.5 55/50.5 52/51      4 inches [x]      [x] 41.5/41 42.5/44.5 39.5/39.5 42/44 42/42      Ankle   [x]      [x] 30.5/31 29/28.5 28/27.5 29/29.5 30/31      Instep   [x]      [x] 25/26 24/26 24/25 24/25 24/25.5    Measurements are taken in centimeters:  2.54 cm = 1 inch  Total:right 195cm 189.5 181 196 cm 193.5    left 196cm 191.5 182.5 194 cm 194.5          Physical Skills Involved:  1. Activity Tolerance  2. Edema  3. Skin Integrity  4. pain Cognitive Skills Affected (resulting in the inability to perform in a timely and safe manner):  1. Psychosocial Skills Affected:  1. Habits/Routines   Number of elements that affect the Plan of Care: 3-5:  MODERATE COMPLEXITY   CLINICAL DECISION MAKING:   Outcome Measure: Tool Used: Tool Used: Lymphedema Life Impact Scale   Score:  Initial: 21 Most Recent: 18 (Date: 11/2/21)   Interpretation of Score: The Lymphedema Life Impact Scale (LLIS) is a validated instrument that measures the physical, functional, and psychosocial concerns pertinent to patients with extremity lymphedema. The Scale's questionnaire is administered to patients to gauge impairments, activity limitations, and participation restrictions resulting from their lymphedema. Score 0 1-13 14-26 27-40 41-54 55-67 68   Modifier CH CI CJ CK CL CM CN     ? Other PT/OT Primary Functional Limitations:     - CURRENT STATUS: CJ - 20%-39% impaired, limited or restricted    - GOAL STATUS: CI - 1%-19% impaired, limited or restricted    - D/C STATUS:  ---------------To be determined---------------       Medical Necessity:   · Skilled intervention continues to be required due to uncontrolled swelling increasing risk of cellulitis and hindering ADL's. Reason for Services/Other Comments:  · Patient continues to require skilled intervention due to inability to self manage lymphedema at this time.   Clinical Decision-Making Assessment:     Use of outcome tool(s) and clinical judgement create a POC that gives a: Questionable prediction of patient's progress: MODERATE COMPLEXITY   TREATMENT:   (In addition to Assessment/Re-Assessment sessions the following treatments were rendered)    Pre-treatment Symptoms/Complaints:  \"I am supposed to get my other velcro wrap today\"  Pain: Initial:   Pain Intensity 1: 4  Post Session:  4   Occupational Therapy Treatments:    Therapeutic Exercise ( minutes):     HEP:  As above; handouts given to patient for all exercises. Manual Therapy:(45 minutes):  He received manual lymphatic drainage B LEs and trunk. Skin care with Eucerin followed by compression bandaging. Manual Lymph Drainage:          Lymph Nodes:    Cervical Supraclavicular Axillary Abdominal Inguinal Popliteal Antecubital   RIGHT X     X    X     -     X     X     -       LEFT X     X     X     -     X    X     -          Anastamoses:   Axillo-axillary Inguino-inguinal Axillo-inguinal Inguino-axillary   ANTERIOR -     -     -     X       POSTERIOR -     -     -     -       RIGHT -     -     -     X       LEFT -     -     -     X         Limbs:   -    RUE     -    LUE     X    RLE    X    LLE   ADL/self care: (15 minutes): Following skin care, bandaged R & L LEs with biagrip and 2 short stretch bandages  Treatment/Session Assessment:    · Response to Treatment:  Tolerated treatment without complication. · Compliance with Program/Exercises: Improving compliance  · Recommendations/Intent for next treatment session: \"Next visit will focus on reduction and self-management of B LE lymphedema.   Total Treatment Duration:60min  OT Patient Time In/Time Out  Time In: 0800  Time Out: 0900    DEAN Austin/LIA, CLT

## 2021-11-23 ENCOUNTER — HOSPITAL ENCOUNTER (OUTPATIENT)
Dept: PHYSICAL THERAPY | Age: 76
Discharge: HOME | End: 2021-11-23
Payer: MEDICARE

## 2021-11-23 PROCEDURE — 97140 MANUAL THERAPY 1/> REGIONS: CPT

## 2021-11-23 PROCEDURE — 97535 SELF CARE MNGMENT TRAINING: CPT

## 2021-11-23 NOTE — PROGRESS NOTES
Germán Wyman  : 1945  [place]  Secondary: [facility] [address]  [address], [address]  [phone]   [phone]           OUTPATIENT OCCUPATIONAL THERAPY: Daily Note 2021    ICD-10: Treatment Diagnosis: I89.0 lymphedema not elsewhere classified, M79.89 swelling of both LE's. M79.609 pain in limbs  Precautions/Allergies:   Patient has no allergy information on record. Fall Risk Score:    Ambulatory/Rehab Services H2 Model Falls Risk Assessment    Risk Factors:       (1)  Gender [Male] Ability to Rise from Chair:       (1)  Pushes up, successful in one attempt    Falls Prevention Plan: Mobility Assistance Device (specify):  cane   Total: (5 or greater = High Risk): 2     [facility] [address] States Patent #0,[id number]. Federal Law prohibits the replication, distribution or use without written permission from [facility]     MD Orders: eval and treat MEDICAL/REFERRING DIAGNOSIS:   Lymphedema, not elsewhere classified [I89.0]   DATE OF ONSET: chronic    REFERRING PHYSICIAN: Anabell Bernal MD  RETURN PHYSICIAN APPOINTMENT: to be determined      PROGRESS NOTE 21:  Mr. Abran Jamison has completed 9 lymphedema treatment sessions from 21 through 21. Pt was educated on lymphatic pathophysiology, complete decongestive therapy, precautions and skin integrity management. He received manual lymphatic drainage and compression bandaging. He demonstrated reduction of 14 cm R LE and 13.5 cm L LE cumulative circumferential volumetric measurements. Today, he has had an increase in measurements with excessively dry skin and ongoing tissue issues. Mr. Abran Jamison did not address skin care or wear compression [place]  He requires ongoing therapy to achieve reduction and self management with compression garments, possibly a pump for home use, and exercise. INITIAL ASSESSMENT:  Mr. Juan Forde was referred to OT for the evaluation and treatment of bilateral LE lymphedema. Swelling has been present for >8 years. He had therapy for lymphedema including MLD and bandaging some years ago at [facility]. He responded well to therapy but did not continue to wear compression so LE redness and swelling returned and worsened over the years. He had RTHA in July 2021 and is being seen for outpatient PT in [email or web address] Swelling did not change following surgery. He reports legs are very heavy and interfere with standing, walking, perform ADL's. He has difficulty donning shoes and socks - has sock aid. He is a recent  but adult grandson lives with him and helps as needed. Mr Juan Forde presents with bilateral LE lymphedema with thick/dense fluid, hyperpigmentation, lipodermatosclerosis, skin dryness, pain. He requires skilled OT for complete decongestive therapy to reduce LE swelling before transitioning to compression garments. He has already ordered compression velcro wraps from [facility] and they will be ready Oct 7th. PLAN OF CARE:   PROBLEM LIST:  1. Increased Pain  2. Decreased Activity Tolerance  3. Edema/Girth  4. Decreased Knowledge of Precautions  5. Decreased Skin Integrity/Hygeine INTERVENTIONS PLANNED  1. Skin care  2. Compression bandaging  3. Fitting for compression garment(s)  4. Manual therapy/Manual lymph drainage  5. Therapeutic exercise/Therapeutic activities  6. Patient Education  7. Compression pump trial prn  8.  kinesiotaping    TREATMENT PLAN:  Effective Dates: 9/30/21 to 12/29/2021 Frequency/Duration: 2x/week up to 90 days  2 xweek x90 days  and upon reassessment. Will adjust frequency and duration as progress indicates. GOALS: (Goals have been discussed and agreed upon with patient.)  Short-Term Functional Goals: Time Frame: 45 days  1.  The patient/caregiver will verbalize understanding of lymphedema precautions. Goal met  2. Patient will be independent with skin care regimen to decrease risk of cellulitis. Goal met  3. The patient/caregiver will be independent with self-manual lymph drainage techniques and show decrease in limb volume. Progressing  4. Patient will be independent in lymphatic exercises. Progressing  Discharge Goals: Time Frame: 90 days  1. Patient's bilateral lower extremity circumferential measurements will decrease on volumetric graph by 5-7cm to maximize functional use in ADL's. Progressing  2. The patient/caregiver will be independent with home management of lymphedema. Progressing  3. Patient/caregiver will be independent donning and doffing bilateral lower extremity compression garment. Progressing    Rehabilitation Potential For Stated Goals: Good              The information in this section was collected on 11/23/2021   (except where otherwise noted). OCCUPATIONAL PROFILE & HISTORY:   History of Present Injury/Illness (Reason for Referral):  Mr. Kike Aguirre was referred to OT for the evaluation and treatment of bilateral LE lymphedema. Swelling has been present for >8 years. He had therapy for lymphedema including MLD and bandaging some years ago at [facility]. He responded well to therapy but did not continue to wear compression so LE redness and swelling returned and worsened over the years. He had RTHA in July 2021 and is being seen for outpatient PT in [facility]. Swelling did not change following surgery. He reports legs are very heavy and interfere with standing, walking, perform ADL's. He has difficulty donning shoes and socks - has sock aid. He is a recent  but adult grandson lives with him and helps as needed. Mr Kike Aguirre presents with bilateral LE lymphedema with thick/dense fluid, hyperpigmentation, lipodermatosclerosis, skin dryness, pain.  He requires skilled OT for complete decongestive therapy to reduce LE swelling before transitioning to compression garments. He has already ordered compression velcro wraps from [facility] and they will be ready Oct 7th. Past Medical History/Comorbidities:   Sleep apnea with CPAP, arthritis, cellulitis of LLE, catarcts, DM, GERD, hiatal hernia, HTN, Lymphedema, prostate cancer, varicella, venous insufficiency, RTHA 7/21  Social History/Living Environment:     pt lives in his own home with adult grandson -   Prior Level of Function/Work/Activity:  Works from home  - sits for extended periods of time at desk - very sedentary since [address] in July   Dominant Side:         RIGHT  Previous Treatment Approaches:          Therapy, compression   Current Medications:  No current outpatient medications on file.  Pt will bring list with him to next appt           Date Last Reviewed:  11/23/2021   Complexity Level : Expanded review of therapy/medical records (1-2):  MODERATE COMPLEXITY   ASSESSMENT OF OCCUPATIONAL PERFORMANCE:   Palpation:          Bilateral LE lymphedema : thick, dense fluid from foot to knee   ROM:         PT s/p RTHA  Strength:          Overall decreased - generalized debility   Skin Integrity:          Small skin wound on left anterior tibial area that was weeping but now that it is healing weeping has stopped  - pt demonstrates significant skin changes with hyperpigmentation, skin dryness, lipodermatosclerosis     Sensation:  Intact light touch   Functional Mobility:  Cane   Activities of Daily Living : modified independence - has difficulty donning shoes/socks - has sock aid and is wearing slip on shoes   Edema/Girth:  2+ and pitting   PRETREATMENT AFFECTED LIMB(s): right lower extremity  left lower extremity      Date:  9.30.21 10.14.21 10.21.21 11.2.21 11.16.21     Right / Left           Groin   []      []           8 inches   []      []           4 inches   []      []         PoplitealSpace   [x]      [x] 47/46 45/45 44/44 46/45 45.5/45      8 inches   [x]      [x] 51/52 49/47.5 45.5/46.5 55/50.5 52/51      4 inches [x]      [x] 41.5/41 42.5/44.5 39.5/39.5 42/44 42/42      Ankle   [x]      [x] 30.5/31 29/28.5 28/27.5 29/29.5 30/31      Instep   [x]      [x] 25/26 24/26 24/25 24/25 24/25.5    Measurements are taken in centimeters:  2.54 cm = 1 inch  Total:right 195cm 189.5 181 196 cm 193.5    left 196cm 191.5 182.5 194 cm 194.5          Physical Skills Involved:  1. Activity Tolerance  2. Edema  3. Skin Integrity  4. pain Cognitive Skills Affected (resulting in the inability to perform in a timely and safe manner):  1. Psychosocial Skills Affected:  1. Habits/Routines   Number of elements that affect the Plan of Care: 3-5:  MODERATE COMPLEXITY   CLINICAL DECISION MAKING:   Outcome Measure: Tool Used: Tool Used: Lymphedema Life Impact Scale   Score:  Initial: 21 Most Recent: 18 (Date: 11/2/21)   Interpretation of Score: The Lymphedema Life Impact Scale (LLIS) is a validated instrument that measures the physical, functional, and psychosocial concerns pertinent to patients with extremity lymphedema. The Scale's questionnaire is administered to patients to gauge impairments, activity limitations, and participation restrictions resulting from their lymphedema. Score 0 1-13 14-26 27-40 41-54 55-67 68   Modifier CH CI CJ CK CL CM CN     ? Other PT/OT Primary Functional Limitations:     - CURRENT STATUS: CJ - 20%-39% impaired, limited or restricted    - GOAL STATUS: CI - 1%-19% impaired, limited or restricted    - D/C STATUS:  ---------------To be determined---------------       Medical Necessity:   · Skilled intervention continues to be required due to uncontrolled swelling increasing risk of cellulitis and hindering ADL's. Reason for Services/Other Comments:  · Patient continues to require skilled intervention due to inability to self manage lymphedema at this time.   Clinical Decision-Making Assessment:     Use of outcome tool(s) and clinical judgement create a POC that gives a: Questionable prediction of patient's progress: MODERATE COMPLEXITY   TREATMENT:   (In addition to Assessment/Re-Assessment sessions the following treatments were rendered)    Pre-treatment Symptoms/Complaints:  \"I have both of my wraps now and have them on this morning. I can get them on, but it takes time because of my R hip replacement. \"  Pain: Initial:   Pain Intensity 1: 4  Post Session:  4   Occupational Therapy Treatments:    Therapeutic Exercise ( minutes):     HEP:  As above; handouts given to patient for all exercises. Manual Therapy:(45 minutes):  He received manual lymphatic drainage B LEs and trunk. Skin care with Eucerin followed by compression bandaging. Manual Lymph Drainage:          Lymph Nodes:    Cervical Supraclavicular Axillary Abdominal Inguinal Popliteal Antecubital   RIGHT X     X    X     -     X     X     -       LEFT X     X     X     -     X    X     -          Anastamoses:   Axillo-axillary Inguino-inguinal Axillo-inguinal Inguino-axillary   ANTERIOR -     -     -     X       POSTERIOR -     -     -     -       RIGHT -     -     -     X       LEFT -     -     -     X         Limbs:   -    RUE     -    LUE     X    RLE    X    LLE   ADL/self care: (15 minutes): Following skin care, bandaged R & L LEs with biagrip and 2 short stretch bandages. He will remove bandages after a few days and apply [employer] velcro wraps for compression until his next appointment. \"  Treatment/Session Assessment:    · Response to Treatment:  Tolerated treatment without complication. He is progressing toward long term compression as he has received his [employer] velcro wraps. · Compliance with Program/Exercises: Improving compliance  · Recommendations/Intent for next treatment session: \"Next visit will focus on reduction and self-management of B LE lymphedema.   Total Treatment Duration: 60min  OT Patient Time In/Time Out  Time In: 0800  Time Out: 0900    DEAN Ball/LIA, CLT

## 2021-11-30 ENCOUNTER — HOSPITAL ENCOUNTER (OUTPATIENT)
Dept: PHYSICAL THERAPY | Age: 76
Discharge: HOME | End: 2021-11-30
Payer: MEDICARE

## 2021-11-30 PROCEDURE — 97535 SELF CARE MNGMENT TRAINING: CPT

## 2021-11-30 PROCEDURE — 97140 MANUAL THERAPY 1/> REGIONS: CPT

## 2021-11-30 NOTE — THERAPY DISCHARGE
Toni   : 1945  [place]  Secondary: [address] [facility] Therapy  [address], [facility], [address]  [phone]   [phone]           OUTPATIENT OCCUPATIONAL THERAPY: Daily Note and Discharge 2021    ICD-10: Treatment Diagnosis: I89.0 lymphedema not elsewhere classified, M79.89 swelling of both LE's. M79.609 pain in limbs  Precautions/Allergies:   Patient has no allergy information on record. Fall Risk Score:    Ambulatory/Rehab Services H2 Model Falls Risk Assessment    Risk Factors:       (1)  Gender [Male] Ability to Rise from Chair:       (1)  Pushes up, successful in one attempt    Falls Prevention Plan: Mobility Assistance Device (specify):  cane   Total: (5 or greater = High Risk): 2     [facility] States Patent #[id number]. Federal Law prohibits the replication, distribution or use without written permission from [facility]     MD Orders: eval and treat MEDICAL/REFERRING DIAGNOSIS:   Lymphedema, not elsewhere classified [I89.0]   DATE OF ONSET: chronic    REFERRING PHYSICIAN: Johnna Cano MD  RETURN PHYSICIAN APPOINTMENT: to be determined    DISCHARGE 21:  Mr. Polly Rodriguez completed 16 lymphedema treatment sessions from 21 through 21. He is independent with self management, although has demonstrated poor compliance with compression management, exercises and skin care. A pump referral was sent to [facility]. He has been educated in exercises and self MLD. His measurements have increased since a significant reduction was achieved in October, with 14 cm reduction R LE and 13.5 cm reduction L LE. His measurements are now increased 3.5 cm R LE and 7.5 cm L LE, with patient stating he goes 3 to 4 days without compression or elevation. Mr. Polly Rodriguez has velcro wraps and STACIA liners for self management.   He is discharged from therapy, having received maximum benefits at this time. PROGRESS NOTE 11/2/21:  Mr. Apple Weaver has completed 9 lymphedema treatment sessions from 9/30/21 through 11/2/21. Pt was educated on lymphatic pathophysiology, complete decongestive therapy, precautions and skin integrity management. He received manual lymphatic drainage and compression bandaging. He demonstrated reduction of 14 cm R LE and 13.5 cm L LE cumulative circumferential volumetric measurements. Today, he has had an increase in measurements with excessively dry skin and ongoing tissue issues. Mr. Apple Weaver did not address skin care or wear compression [place]. He requires ongoing therapy to achieve reduction and self management with compression garments, possibly a pump for home use, and exercise. INITIAL ASSESSMENT:  Mr. Apple Weaver was referred to OT for the evaluation and treatment of bilateral LE lymphedema. Swelling has been present for >8 years. He had therapy for lymphedema including MLD and bandaging some years ago at [facility]. He responded well to therapy but did not continue to wear compression so LE redness and swelling returned and worsened over the years. He had RTHA in July 2021 and is being seen for outpatient PT in [facility]. Swelling did not change following surgery. He reports legs are very heavy and interfere with standing, walking, perform ADL's. He has difficulty donning shoes and socks - has sock aid. He is a recent  but adult grandson lives with him and helps as needed. Mr Apple Weaver presents with bilateral LE lymphedema with thick/dense fluid, hyperpigmentation, lipodermatosclerosis, skin dryness, pain. He requires skilled OT for complete decongestive therapy to reduce LE swelling before transitioning to compression garments. He has already ordered compression velcro wraps from [facility] and they will be ready Oct 7th.     PLAN OF CARE:   PROBLEM LIST:  1. Increased Pain  2. Decreased Activity Tolerance  3. Edema/Girth  4. Decreased Knowledge of Precautions  5. Decreased Skin Integrity/Hygeine INTERVENTIONS PLANNED  1. Skin care  2. Compression bandaging  3. Fitting for compression garment(s)  4. Manual therapy/Manual lymph drainage  5. Therapeutic exercise/Therapeutic activities  6. Patient Education  7. Compression pump trial prn  8.  kinesiotaping    TREATMENT PLAN:  Effective Dates: 9/30/21 to 12/29/2021 Frequency/Duration: 2x/week up to 90 days  2 xweek x90 days  and upon reassessment. Will adjust frequency and duration as progress indicates. GOALS: (Goals have been discussed and agreed upon with patient.)  Short-Term Functional Goals: Time Frame: 45 days  1. The patient/caregiver will verbalize understanding of lymphedema precautions. Goal met  2. Patient will be independent with skin care regimen to decrease risk of cellulitis. Goal met  3. The patient/caregiver will be independent with self-manual lymph drainage techniques and show decrease in limb volume. Goal met  4. Patient will be independent in lymphatic exercises. Goal met  Discharge Goals: Time Frame: 90 days  1. Patient's bilateral lower extremity circumferential measurements will decrease on volumetric graph by 5-7cm to maximize functional use in ADL's. Not met  2. The patient/caregiver will be independent with home management of lymphedema. Goal met  3. Patient/caregiver will be independent donning and doffing bilateral lower extremity compression garment. Goal met                The information in this section was collected on 11/30/2021   (except where otherwise noted). OCCUPATIONAL PROFILE & HISTORY:   History of Present Injury/Illness (Reason for Referral):  Mr. Jordyn Giraldo was referred to OT for the evaluation and treatment of bilateral LE lymphedema. Swelling has been present for >8 years.  He had therapy for lymphedema including MLD and bandaging some years ago at [facility]. He responded well to therapy but did not continue to wear compression so LE redness and swelling returned and worsened over the years. He had RTHA in July 2021 and is being seen for outpatient PT in [facility]. Swelling did not change following surgery. He reports legs are very heavy and interfere with standing, walking, perform ADL's. He has difficulty donning shoes and socks - has sock aid. He is a recent  but adult grandson lives with him and helps as needed. Mr Anupam Delgado presents with bilateral LE lymphedema with thick/dense fluid, hyperpigmentation, lipodermatosclerosis, skin dryness, pain. He requires skilled OT for complete decongestive therapy to reduce LE swelling before transitioning to compression garments. He has already ordered compression velcro wraps [facility] and they will be ready Oct 7th. Past Medical History/Comorbidities:   Sleep apnea with CPAP, arthritis, cellulitis of LLE, catarcts, DM, GERD, hiatal hernia, HTN, Lymphedema, prostate cancer, varicella, venous insufficiency, RTHA 7/21  Social History/Living Environment:     pt lives in his own home with adult grandson -   Prior Level of Function/Work/Activity:  Works from home  - sits for extended periods of time at desk - very sedentary since [address] in July   Dominant Side:         RIGHT  Previous Treatment Approaches:          Therapy, compression   Current Medications:  No current outpatient medications on file.  Pt will bring list with him to next appt           Date Last Reviewed:  11/30/2021   Complexity Level : Expanded review of therapy/medical records (1-2):  MODERATE COMPLEXITY   ASSESSMENT OF OCCUPATIONAL PERFORMANCE:   Palpation:          Bilateral LE lymphedema : thick, dense fluid from foot to knee   ROM:         PT s/p RTHA  Strength:          Overall decreased - generalized debility   Skin Integrity:          Small skin wound on left anterior tibial area that was weeping but now that it is healing weeping has stopped  - pt demonstrates significant skin changes with hyperpigmentation, skin dryness, lipodermatosclerosis     Sensation:  Intact light touch   Functional Mobility:  Cane   Activities of Daily Living : modified independence - has difficulty donning shoes/socks - has sock aid and is wearing slip on shoes   Edema/Girth:  2+ and pitting   PRETREATMENT AFFECTED LIMB(s): right lower extremity  left lower extremity      Date:  9.30.21 10.14.21 10.21.21 11.2.21 11.16.21 11.30.21    Right / Left           Groin   []      []           8 inches   []      []           4 inches   []      []         PoplitealSpace   [x]      [x] 47/46 45/45 44/44 46/45 45.5/45 47.5/47     8 inches   [x]      [x] 51/52 49/47.5 45.5/46.5 55/50.5 52/51 54/54     4 inches   [x]      [x] 41.5/41 42.5/44.5 39.5/39.5 42/44 42/42 43/46     Ankle   [x]      [x] 30.5/31 29/28.5 28/27.5 29/29.5 30/31 30/31.5     Instep   [x]      [x] 25/26 24/26 24/25 24/25 24/25.5 24/25   Measurements are taken in centimeters:  2.54 cm = 1 inch  Total:right 195cm 189.5 181 196 cm 193.5 198.5 cm   left 196cm 191.5 182.5 194 cm 194.5 203.5 cm         Physical Skills Involved:  1. Activity Tolerance  2. Edema  3. Skin Integrity  4. pain Cognitive Skills Affected (resulting in the inability to perform in a timely and safe manner):  1. Psychosocial Skills Affected:  1. Habits/Routines   Number of elements that affect the Plan of Care: 3-5:  MODERATE COMPLEXITY   CLINICAL DECISION MAKING:   Outcome Measure: Tool Used: Tool Used: Lymphedema Life Impact Scale   Score:  Initial: 21 Most Recent: 10 (Date: 11/30/21)   Interpretation of Score: The Lymphedema Life Impact Scale (LLIS) is a validated instrument that measures the physical, functional, and psychosocial concerns pertinent to patients with extremity lymphedema.   The Scale's questionnaire is administered to patients to gauge impairments, activity limitations, and participation restrictions resulting from their lymphedema. Score 0 1-13 14-26 27-40 41-54 55-67 68   Modifier CH CI CJ CK CL CM CN     ? Other PT/OT Primary Functional Limitations:     - CURRENT STATUS: CJ - 20%-39% impaired, limited or restricted    - GOAL STATUS: CI - 1%-19% impaired, limited or restricted    - D/C STATUS:  CI - 1%-19% impaired, limited or restricted       Medical Necessity:   · Skilled intervention continues to be required due to uncontrolled swelling increasing risk of cellulitis and hindering ADL's. Reason for Services/Other Comments:  · Patient continues to require skilled intervention due to inability to self manage lymphedema at this time. Clinical Decision-Making Assessment:     Use of outcome tool(s) and clinical judgement create a POC that gives a: Questionable prediction of patient's progress: MODERATE COMPLEXITY   TREATMENT:   (In addition to Assessment/Re-Assessment sessions the following treatments were rendered)    Pre-treatment Symptoms/Complaints:  \"I did not wear any compression Thursday through Sunday; I did put the velcro wraps on Sunday night but I think I should put them on tighter\"  Pain: Initial:   Pain Intensity 1: 0  Post Session:  4   Occupational Therapy Treatments:    Therapeutic Exercise ( minutes):     HEP:  As above; handouts given to patient for all exercises. Manual Therapy:(45 minutes):  He received manual lymphatic drainage B LEs and trunk. Skin care with Eucerin followed by compression bandaging.            Manual Lymph Drainage:          Lymph Nodes:    Cervical Supraclavicular Axillary Abdominal Inguinal Popliteal Antecubital   RIGHT X     X    X     -     X     X     -       LEFT X     X     X     -     X    X     -          Anastamoses:   Axillo-axillary Inguino-inguinal Axillo-inguinal Inguino-axillary   ANTERIOR -     -     -     X       POSTERIOR -     -     -     -       RIGHT -     -     -     X       LEFT -     -     -     X         Limbs:   -    RUE     -    LUE     X    RLE X    LLE   ADL/self care: (15 minutes): Following skin care, bandaged R & L LEs with biagrip, Rosidal foam and 3 short stretch bandages. Reviewed home management with self MLD, exercises, elevation and compression management DAILY.       Treatment/Session Assessment:    · Response to Treatment:  Pt has received maximum benefits from therapy and is able to follow through with independent self management  · Compliance with Program/Exercises: Inconsistent  · Recommendations/Intent for next treatment session: Discharge today  Total Treatment Duration: 60min  OT Patient Time In/Time Out  Time In: 0800  Time Out: 0900    Milton Camarena OTR/L, CLT-[facility]

## 2023-10-19 ENCOUNTER — HOSPITAL ENCOUNTER (OUTPATIENT)
Dept: NUTRITION | Age: 78
Setting detail: RECURRING SERIES
Discharge: HOME | End: 2023-10-22
Payer: MEDICARE

## 2023-10-19 PROCEDURE — 97802 MEDICAL NUTRITION INDIV IN: CPT

## 2023-10-19 NOTE — PROGRESS NOTES
handout, replacement options for convenience meats,       Patient verbalized understanding of recommendations discussed. Goal: Improve diet quality to achieve optimal glycemic control, and delay progression of renal disease. Action Steps:           1. Increase dietary fiber intake to 8-10 g per meal.   Add vegetables to lunch and dinner meals, ground flaxseed/ wheat germ to breakfast yogurt. 2. Reduce inorganic phosphorus in diet, by reviewing food labels. 3. Aim for 30 minutes of exercise every day-- start with 5-10 minutes walking after each meal.                MONITORING & EVALUATION:  Monitor Food and Nutrient Intake  Follow Up: 3 weeks     75 face to face time spent was spent with this patient to assess, identify barrier, problem solve and create nutrition goals to support improvements in chronic disease risk factors.

## 2023-11-13 ENCOUNTER — HOSPITAL ENCOUNTER (OUTPATIENT)
Dept: NUTRITION | Age: 78
Setting detail: RECURRING SERIES
Discharge: HOME | End: 2023-11-16
Payer: MEDICARE

## 2023-11-13 PROCEDURE — 97802 MEDICAL NUTRITION INDIV IN: CPT

## 2023-11-13 PROCEDURE — 97803 MED NUTRITION INDIV SUBSEQ: CPT

## 2023-11-13 NOTE — PROGRESS NOTES
Louann Blackmon, MS RD LD, [facility]  Outpatient Registered Dietitian  [facility]  Phone: [phone] Fax: [phone]     Pt is a 69 y.o. male referred with the following diagnosis (es): Type 2 diabetes mellitus with diabetic chronic kidney disease [E11.22]   Newly diagnosed with diabetes in March 2023. A1C 7.5 on 6/28/2023. Presently does not own a glucometer, never has he monitored his blood sugars at home. Today he is not taking any medications for his diabetes, has been prescribed Farxiga which he has not started taking yet, and no longer is taking Rybelsus. Rencently learned that he has CKD with GFR= 46 per PCP noted dated 9/25/2023.     11/13/2023 Follow up assessment  Stephanie Goode comes in to follow up on his nutrition goals from 1 month ago. Progressing towards glycemic control goals today. States energy level is improving, no longer sleeping during the day. Contemplates checking FSBG. Since last assessment he followed up with his PCP, had a new a1C drawn, down from 7.5 to 7.1. He also saw a nephrologist for the first time, GFR= 47, stable from last assessment. In office weight: 141kg. Diet recall: Tracking meals, consuming 3 meals per day and 2-3 snacks. Switched to unsweetened beverages, increasing intake of dietary fiber, consuming oats and whole grain breads, vegetables at lunch and dinner. Progressing towards decreasing sodium in diet. Quality for snack choices have improved, including fruits, greek yogurt, popcorn. Sugar free \"diet foods\" are consumed as well, SF jello, ice cream, pops. Intervention (60 min):  - reviewed 3 weeks of tracked meals, provided modifications to support post glucose response.  - discussed meals timing  - restructured current thoughts around food, educated on restriction and binge cycle. - discussed Carbohydrate goals: 30-45g total carbohydrates per meal, and 20-30g per snack. - low sodium seasoning lists discussed, handouts provided.

## 2024-01-22 ENCOUNTER — HOSPITAL ENCOUNTER (OUTPATIENT)
Dept: NUTRITION | Age: 79
Setting detail: RECURRING SERIES
Discharge: HOME | End: 2024-01-25
Payer: MEDICARE

## 2024-01-22 PROCEDURE — 97803 MED NUTRITION INDIV SUBSEQ: CPT

## 2024-01-22 NOTE — PROGRESS NOTES
cycle.   - discussed Carbohydrate goals: 30-45g total carbohydrates per meal, and 20-30g per snack.  - low sodium seasoning lists discussed, handouts provided.   - discuss fluid weight. Advised to weigh daily.      Goal: Improve diet quality to achieve optimal glycemic control, and delay progression of renal disease.  Action Steps:           1. Increase dietary fiber intake to 8-10 g per meal.   Add vegetables to lunch and dinner meals, ground flaxseed/ wheat germ to breakfast yogurt.   2. Reduce inorganic phosphorus in diet, by reviewing food labels.  3. Aim for 30 minutes of exercise every day-- start with 5-10 minutes walking after each meal.                 Follow up in 2 months.     Angeles Del Rio, MS RD , [facility]     10/19/2023  ASSESSMENT     Patient stated goal:   Confused about nutrition for diabetes, CKD. States he would like to work on Weight loss as well.      Eating behaviors and factors impacting food choices:   -Established food preferences/eating behaviors- processed meats- kilbasa, sausage, spam, deli meats.   -Confusion on nutrition advice  - weight cycling- went on optifast for 1 year, lost 50 pounds so that he would qualify for joint surgery. \"Hated it\", gained all the weight back after surgery.      Initial Diet Recall:   B:Greek Yogurt (5 ounce), 1 cup grapes, black coffee  L: black coffee, spam sandwich with lettuce, tomato, bob, on whole grain bread, side salad  D: whole grain pasta with sausage  Snacks: sugar free jello, fruit,      Eating pattern appears excessive in sodium, and inadequate in fat, fiber, and vegetables.  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   Stage of Change: Preparation.     Anthropometrics:   Wt: 137.7 kg  Ht: 167.6cm, BMI= 49         Estimated Needs: 2400 (MSJ x 1.2- 700= 2100  Diabetics

## 2024-02-27 ENCOUNTER — HOSPITAL ENCOUNTER (OUTPATIENT)
Dept: NUTRITION | Age: 79
Setting detail: RECURRING SERIES
Discharge: HOME | End: 2024-03-01
Payer: MEDICARE

## 2024-02-27 PROCEDURE — 97803 MED NUTRITION INDIV SUBSEQ: CPT

## 2024-02-27 NOTE — PROGRESS NOTES
seeds.  Encouraged liberalization and incorporating more fruits and vegetables.  Application: Collaborated with pt to create sample meals/snacks. Pt was able to verbalize food sources of protein via teach back.  Dicussed glucose monitoring and the benefits. -- he is not ready to check blood sugars today.  Reviewed A1C value and target goal of <7.0. Reviewed complications associated with suboptimal glucose control.   Reviewed the impact of exercise on glucose updated today.  Encouraged 10 minutes of movement after each meal.      Materials Provided and Discussed:              Fiber handout, replacement options for convenience meats,         Patient verbalized understanding of recommendations discussed.        Goal: Improve diet quality to achieve optimal glycemic control, and delay progression of renal disease.   Action Steps:           1. Increase dietary fiber intake to 8-10 g per meal.   Add vegetables to lunch and dinner meals, ground flaxseed/ wheat germ to breakfast yogurt.   2. Reduce inorganic phosphorus in diet, by reviewing food labels.  3. Aim for 30 minutes of exercise every day-- start with 5-10 minutes walking after each meal.                 MONITORING & EVALUATION:  Monitor Food and Nutrient Intake  Follow Up: 3 weeks      75 face to face time spent was spent with this patient to assess, identify barrier, problem solve and create nutrition goals to support improvements in chronic disease risk factors

## 2024-05-22 ENCOUNTER — HOSPITAL ENCOUNTER (OUTPATIENT)
Dept: NUTRITION | Age: 79
Setting detail: RECURRING SERIES
Discharge: HOME | End: 2024-05-25
Payer: MEDICARE

## 2024-05-22 PROCEDURE — G0270 MNT SUBS TX FOR CHANGE DX: HCPCS

## 2024-05-22 NOTE — PROGRESS NOTES
with pt on meal planning breakfast meals: add bell peppers/onion to breakfast.   -Educated on factors that can lead to higher creatinine levels- advised to decrease processed/canned/cured meats to 1x per week, reduce red meats to 1-2x per week.  Aim for eggs, beans, poultry, fish, tofu.   - Advised to increase fruit servings to 2 daily, and non starchy vegetables to 3 cups per day.   - reviewed food labels, and educated on low sodium snack options. List provided today. Select swiss cheese, reduce processed meats.  - advise to replace 50% of coffee with water.      Extend Goal: Improve diet quality to achieve optimal glycemic control, and delay progression of renal disease.  Action Steps:   1. Consume 3 cups of non starchy vegetables daily.   Add vegetables to lunch and dinner meals     2. Increase water intake by 32 ounces     3.Continue to Reduce inorganic phosphorus in diet, by reviewing food labels.  4. Aim for low sodium snacks- use list provided.   5.   Track meals/snacks                Follow up in 1 months.  Pt has requested additional support with meal planning today to support reduction in A1C.      Angeles Del Rio, MS RD LD, [facility]        11/13/2023 Follow up assessment  Rick comes in to follow up on his nutrition goals from 1 month ago. Progressing towards glycemic control goals today. States energy level is improving, no longer sleeping during the day. Contemplates checking FSBG.  Since last assessment he followed up with his PCP, had a new a1C drawn, down from 7.5 to 7.1.  He also saw a nephrologist for the first time, GFR= 47, stable from last assessment.     In office weight: 141kg.      Diet recall: Tracking meals, consuming 3 meals per day and 2-3 snacks. Switched to unsweetened beverages, increasing intake of dietary fiber, consuming oats and whole grain breads, vegetables at lunch and dinner.  Progressing towards decreasing sodium in diet.   Quality for snack choices have improved, including fruits,

## 2024-05-31 ENCOUNTER — TELEPHONE (OUTPATIENT)
Dept: DIABETES SERVICES | Age: 79
End: 2024-05-31

## 2024-05-31 NOTE — TELEPHONE ENCOUNTER
Type 2.  Call to patient and he is interested in DM education.  Let him know free at no cost to him, and we teach in [place].  He wanted to book Nutrition first.  Booked both classes in [place].

## 2024-06-04 ENCOUNTER — FOLLOWUP TELEPHONE ENCOUNTER (OUTPATIENT)
Dept: DIABETES SERVICES | Age: 79
End: 2024-06-04

## 2024-06-04 NOTE — TELEPHONE ENCOUNTER
No show for nutrition diabetes #1 class today in [place]. Called and left message to call [phone].

## 2024-06-27 ENCOUNTER — HOSPITAL ENCOUNTER (OUTPATIENT)
Dept: NUTRITION | Age: 79
Setting detail: RECURRING SERIES
Discharge: HOME | End: 2024-06-30
Payer: MEDICARE

## 2024-06-27 PROCEDURE — G0270 MNT SUBS TX FOR CHANGE DX: HCPCS

## 2024-06-27 NOTE — PROGRESS NOTES
Angeles Del Rio MS RD LD, [facility]  Outpatient Registered Dietitian  [facility]  Phone: [phone] Fax: [phone]        Second referral received from referring provider on 4/19/2024  for patient to continue MNT appointments with [facility].    Patient was seen for follow up and overall has been progressing well towards individualized nutrition goals, but has not met the recommended 5-10% weight reduction or <7 A1C goals.  Pt has requested additional support for nutrition counseling.            Pt is a 78 y.o. male referred with the following diagnosis (es): Type 2 diabetes mellitus with diabetic chronic kidney disease [E11.22]   Newly diagnosed with diabetes in March 2023. A1C 7.5 on 6/28/2023.  Presently does not own a glucometer, never has he monitored his blood sugars at home. Diabetes medications: Farxiga, semaglutide.       6/27/2024  Rick comes in today without his meal tracked. No changes in medications since last visit. He did not attend the DSME nutrition class as he was not feeling well.     Has an upcoming nephrology appointment, labs were drawn UCR= 34 (H), other labs have not resulted yet.  Maintains taking medications as prescribed, maintains not checking his BG at home.     Made homemade beef stew with potatoes and carrots yesterday, eats it over rice or mashed potatoes. Skipped breakfast today, and asks about skipping lunch.  Continue to advise this patient not to skip meals.   Barriers Identified: food preferences= packaged/prepared meats    Intervention: 30 min  - restructured meals to include fiber and reduction in sodium  - provided fiber rich carbohydrate food options- beans, quinoa, and green peas to try instead of potatoes, bread and white rice.   - encouraged consume 3 meals daily to reduce carbohydrates load on dinner meal.   - Demonstrated my net diary. Agreeable to download  - In office weight: 306 pounds    Goals: presently not meeting any goals outlined below.